# Patient Record
Sex: FEMALE | HISPANIC OR LATINO | Employment: OTHER | URBAN - METROPOLITAN AREA
[De-identification: names, ages, dates, MRNs, and addresses within clinical notes are randomized per-mention and may not be internally consistent; named-entity substitution may affect disease eponyms.]

---

## 2021-08-12 ENCOUNTER — TRANSFERRED RECORDS (OUTPATIENT)
Dept: HEALTH INFORMATION MANAGEMENT | Facility: CLINIC | Age: 36
End: 2021-08-12
Payer: MEDICAID

## 2021-08-12 LAB
C TRACH DNA SPEC QL PROBE+SIG AMP: NOT DETECTED
HPV ABSTRACT: ABNORMAL
N GONORRHOEA DNA SPEC QL PROBE+SIG AMP: NOT DETECTED
PAP-ABSTRACT: NORMAL
SPECIMEN DESCRIP: NORMAL
SPECIMEN DESCRIPTION: NORMAL

## 2022-01-06 ENCOUNTER — TRANSFERRED RECORDS (OUTPATIENT)
Dept: HEALTH INFORMATION MANAGEMENT | Facility: CLINIC | Age: 37
End: 2022-01-06

## 2022-03-30 ENCOUNTER — TRANSFERRED RECORDS (OUTPATIENT)
Dept: HEALTH INFORMATION MANAGEMENT | Facility: CLINIC | Age: 37
End: 2022-03-30

## 2022-04-08 ENCOUNTER — TRANSFERRED RECORDS (OUTPATIENT)
Dept: HEALTH INFORMATION MANAGEMENT | Facility: CLINIC | Age: 37
End: 2022-04-08

## 2022-04-22 ENCOUNTER — TRANSCRIBE ORDERS (OUTPATIENT)
Dept: OTHER | Age: 37
End: 2022-04-22

## 2022-04-22 DIAGNOSIS — D09.9 ADENOCARCINOMA IN SITU: ICD-10-CM

## 2022-04-22 DIAGNOSIS — N87.1 MODERATE CERVICAL DYSPLASIA: Primary | ICD-10-CM

## 2022-04-25 ENCOUNTER — DOCUMENTATION ONLY (OUTPATIENT)
Dept: ONCOLOGY | Facility: CLINIC | Age: 37
End: 2022-04-25

## 2022-04-25 ENCOUNTER — PATIENT OUTREACH (OUTPATIENT)
Dept: ONCOLOGY | Facility: CLINIC | Age: 37
End: 2022-04-25

## 2022-04-25 NOTE — PROGRESS NOTES
"Introduced my role as nurse navigator with [facility] and that we have recd the referral for dx of \"moderate cervical dysplasia, adenocarcinoma in situ\" but no records yet.     Explained to pt that he/she will receive a call from our scheduling intake team and the records team will work to obtain the records.   Pt verbalized understanding and denied further questions, has my call-back number if needed.    Patient lives in [place] and would prefer to be seen at [place] location.     Addendum 4/26: Records received and scanned into media tab. Pt had pap/HPV 8/12/21 showing NILM/HPV+, colpo 3/30. 4/8 visit note states plan is for patient to have LEEP procedure, but no additional records.     Attempted to reach referring clinic, [facility] for Amairani at [phone] requesting call back with update on whether patient had/is scheduled to have LEEP procedure.    Path 3/30/22:          Received call back from Amairani who reports plan was for patient to have LEEP, but the clinic's LEEP machine is down and they are unable to complete procedure. Referring to [facility] for LEEP/further management of care. Will route to scheduling to arrange visit as requested.    Larisa Connors, BSN, RN, PHN, OCN  Hematology/Oncology Nurse Navigator  [facility]  [phone]      "

## 2022-04-25 NOTE — PROGRESS NOTES
Action April 25, 2022 12:11 PM ABT   Action Taken Records from [facility] received and sent to HIM for upload

## 2022-04-28 NOTE — PROGRESS NOTES
RECORDS STATUS - ALL OTHER DIAGNOSIS      Action    Action Taken 4/28/2022 1:11pm ROMEO     I called the [facility] Ph: [phone] - Amairani will email me the [employer] request form so I can request pt Jyoti's path slides. I sent my email address to Amairani Fax: [phone]    5/2/2022 11:10AM ROMEO   I faxed over a special [employer] Path form, request form and a FedEX shipping label to [employer]. I spoke to someone in their medical records dept and they verified the fax number and other info.      RECORDS RECEIVED FROM: [facility]/ [facility]    DATE RECEIVED: 5/3/2022   NOTES STATUS DETAILS   OFFICE NOTE from referring provider Complete Referral called in by Amairani [facility] [phone]   DISCHARGE SUMMARY from hospital Complete [facility] Records are in [facility]   DISCHARGE REPORT from the ER     OPERATIVE REPORT Complete Biopsy in [facility]   CLINICAL TRIAL TREATMENTS TO DATE     LABS     PATHOLOGY REPORTS Requested- [facility]  [facility]  Ph: [phone]  Fax: [phone]  Mailing Address:    [address] [place] [address]  [place] Tracking Number: [id number]     3/30/2022 [id number]    8/21/2021 [id number]   ANYTHING RELATED TO DIAGNOSIS     GENONOMIC TESTING     TYPE:     IMAGING (NEED IMAGES & REPORT)     CT SCANS     MRI     MAMMO     ULTRASOUND     PET

## 2022-05-02 RX ORDER — HEPARIN SODIUM 10000 [USP'U]/ML
5000 INJECTION, SOLUTION INTRAVENOUS; SUBCUTANEOUS
Status: CANCELLED | OUTPATIENT
Start: 2022-05-02

## 2022-05-02 NOTE — PROGRESS NOTES
RECORDS STATUS - ALL OTHER DIAGNOSIS      Action    Action Taken 4/28/2022 1:11pm CHEMO   I called the [facility] Ph: [phone] - Amairani will email me the [employer] request form so I can request shaquille Jyoti's path slides. I sent my email address to Amairani Fax: [phone]    5/2/2022 11:10AM KECHEMO   I faxed over a special [employer] Path form, request form and a FedEX shipping label to [employer]. I spoke to someone in their medical records dept and they verified the fax number and other info.     1:25pm [facility]   I called shaquille Montes with the help of an  Ph: [phone] #1- she had some imaging done -Winifred Uribeesa mentioned that she had a scan at [facility] Ph: [phone]- same as [facility] - I called Amairani again...     5/2/2022 2:54PM CHEMO ConnollyAmairani called back - no imaging available.      RECORDS RECEIVED FROM: [facility]/ [facility]    DATE RECEIVED: 5/3/2022   NOTES STATUS DETAILS   OFFICE NOTE from referring provider Complete Referral called in by [facility] [phone]   DISCHARGE SUMMARY from hospital Complete [facility] Records are in [facility]   DISCHARGE REPORT from the ER     OPERATIVE REPORT Complete Biopsy in [facility]   CLINICAL TRIAL TREATMENTS TO DATE     LABS     PATHOLOGY REPORTS Requested- [facility]  [facility]  Ph: [phone]  Fax: [phone]  Mailing Address:    [address] [place] [address]  [email or web address] Tracking Number: [id number]     3/30/2022 [id number]    8/21/2021 [id number]   ANYTHING RELATED TO DIAGNOSIS     GENONOMIC TESTING     TYPE:     IMAGING (NEED IMAGES & REPORT)     CT SCANS     MRI     MAMMO     ULTRASOUND     PET

## 2022-05-03 ENCOUNTER — ONCOLOGY VISIT (OUTPATIENT)
Dept: ONCOLOGY | Facility: CLINIC | Age: 37
End: 2022-05-03
Attending: OBSTETRICS & GYNECOLOGY
Payer: MEDICAID

## 2022-05-03 ENCOUNTER — PRE VISIT (OUTPATIENT)
Dept: ONCOLOGY | Facility: CLINIC | Age: 37
End: 2022-05-03
Payer: MEDICAID

## 2022-05-03 VITALS
DIASTOLIC BLOOD PRESSURE: 70 MMHG | TEMPERATURE: 97 F | SYSTOLIC BLOOD PRESSURE: 106 MMHG | RESPIRATION RATE: 14 BRPM | BODY MASS INDEX: 24.27 KG/M2 | HEART RATE: 60 BPM | HEIGHT: 63 IN | WEIGHT: 137 LBS | OXYGEN SATURATION: 99 %

## 2022-05-03 DIAGNOSIS — D09.9 ADENOCARCINOMA IN SITU: ICD-10-CM

## 2022-05-03 DIAGNOSIS — N87.1 MODERATE CERVICAL DYSPLASIA: ICD-10-CM

## 2022-05-03 DIAGNOSIS — D06.9 ADENOCARCINOMA IN SITU (AIS) OF UTERINE CERVIX: Primary | ICD-10-CM

## 2022-05-03 PROCEDURE — 88305 TISSUE EXAM BY PATHOLOGIST: CPT | Mod: TC | Performed by: OBSTETRICS & GYNECOLOGY

## 2022-05-03 PROCEDURE — G0463 HOSPITAL OUTPT CLINIC VISIT: HCPCS

## 2022-05-03 PROCEDURE — 99205 OFFICE O/P NEW HI 60 MIN: CPT | Mod: 25 | Performed by: OBSTETRICS & GYNECOLOGY

## 2022-05-03 PROCEDURE — 57500 BIOPSY OF CERVIX: CPT | Performed by: OBSTETRICS & GYNECOLOGY

## 2022-05-03 ASSESSMENT — PAIN SCALES - GENERAL: PAINLEVEL: NO PAIN (0)

## 2022-05-03 NOTE — PROGRESS NOTES
"Consult Notes on Referred Patient              RE: Jyoti Pitts  : 1985  CHAI: May 3, 2022      I had the pleasure of seeing your patient Jyoti Pitts here at the Gynecologic Cancer Clinic at the [facility] on 5/3/2022.  As you know she is a very pleasant 37 year old woman with a recent diagnosis of CIN2, AIS.  Given these findings she was subsequently sent to the Gynecologic Cancer Clinic for new patient consultation.  She is accompanied today by her sister via telephone.  A Khmer telephone  was used for the entire visit.    She reports she had an abnormal pap smear in [place] 2 years ago.  She did not follow up and has moved to [place] recently so established care.  She has regular menses without abnormal bleeding.    21:  Pap:  Negative, HPV 16+    3/30/22:  Cervical biopsy:  CIN2 with changes suspicious for AIS      Obstetrics and Gynecology History:  ,  x 2  Regular menses  She has completed child bearing      Past Medical History:  History reviewed. No pertinent past medical history.    Past Surgical History:  History reviewed. No pertinent surgical history.    Health Maintenance:  Last Pap Smear: 20              Result: negative, HPV +  She has had a history of abnormal Pap smears.    Last Mammogram: N/A    Last Colonoscopy: N/A       Social History:  Lives with her children, feels safe at home.  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  Does not have an advanced directive on file and would like her sister Alla to be her POA.  Full code    Family History:   The patient's family history is significant for.  Family History   Problem Relation Age of Onset     Cancer No family hx of          Physical Exam:   /70 (Cuff Size: Adult Regular)   Pulse 60   Temp 97  F (36.1  C) (Tympanic)   Resp 14   Ht 1.588 m (5' 2.5\")   Wt 62.1 kg (137 lb)   LMP 2022 (Exact Date)   SpO2 99%   BMI 24.66 kg/m    Body mass index is 24.66 " kg/m .    General Appearance: healthy and alert, no distress        Cardiovascular: regular rate and rhythm    Respiratory: lungs clear     Gastrointestinal:       abdomen soft, non-tender, non-distended, no organomegaly or masses    Genitourinary: External genitalia and urethral meatus appears normal.  Vagina is smooth without nodularity or masses.  Cervix with a lesion encompassing the entire anterior cervix measuring approximately 2 cm.  Bimanual exam reveal no masses, nodularity or fullness.  Recto-vaginal exam confirms these findings.    Procedure Note:  Consent was obtained.  Speculum was placed and Tischler biopsy forceps was used to obtain two biopsies from 11 and 1 o'clock on the cervix in the area suspicious for malignancy.  Hemostasis was achieved with application of pressure.  patient tolerated the procedure well.  A separate 5 minutes was spent on the procedure today.    Labs:  None      Assessment:    Jyoti Pitts is a 37 year old woman with a new diagnosis of CIN2, AIS.     A total of 60 minutes was spent on patient care today.       Plan:     1.)    CIN2/AIS-we reveiwed the natural history and progression of disease, especially as it relates to HPV. We reviewed her exam today which is suspicious for cancer and thus a second biopsy was obtained.  If this is positive, plan would be for MRI and PET to evaluate if surgical treatment is appropriate.  If negative, then we discussed the need for an excisional procedure with [facility].  Risks, benefits, indications and alternatives were reviewed.  All her questions were answered and she will undergo cervical cold knife conization on 5/12 pending biopsy result.     2.) Genetic risk factors were assessed and the patient does not meet the qualifications for a referral.      3.) Labs and/or tests ordered include: Cervical biopsy     4.) Health maintenance issues addressed today include pt is up to date.    5.) Pre-op teaching was completed today.         Thank you for allowing us to participate in the care of your patient.         Sincerely,    Miranda Cortez MD  Gynecologic Oncology  [facility]       CC

## 2022-05-03 NOTE — PROGRESS NOTES
Nursing Note:  Jyoti Pitts presents today for cervical bx.  Patient seen by provider today: Yes: Dr Cortez.   present during visit today: Yes, Language: Cuban.     Note: N/A.    Labs:  Not Applicable.     Procedure:  Gynecological Procedure: cervical Biopsy.     Verified:  Time out included verbal and active participation of all team members: Yes.      Post Procedure:  Patient tolerated the procedure without incident..    Post Pain Assessment: 0 out of 10.    Discharge Plan:   Departure Mode: Ambulatory.    Miranda Wilkinson CMA

## 2022-05-03 NOTE — LETTER
5/3/2022         RE: Jyoti Pitts  [address] [facility] [address]        Dear Colleague,    Thank you for referring your patient, Jyoti Pitts, to the [facility]. Please see a copy of my visit note below.    Consult Notes on Referred Patient              RE: Jyoti Pitts  : 1985  CHAI: May 3, 2022      I had the pleasure of seeing your patient Jyoti Pitts here at the Gynecologic Cancer Clinic at the [facility] on 5/3/2022.  As you know she is a very pleasant 37 year old woman with a recent diagnosis of CIN2, AIS.  Given these findings she was subsequently sent to the Gynecologic Cancer Clinic for new patient consultation.  She is accompanied today by her sister via telephone.  A Wallisian telephone  was used for the entire visit.    She reports she had an abnormal pap smear in [place] 2 years ago.  She did not follow up and has moved to [place] recently so established care.  She has regular menses without abnormal bleeding.    21:  Pap:  Negative, HPV 16+    3/30/22:  Cervical biopsy:  CIN2 with changes suspicious for AIS      Obstetrics and Gynecology History:  ,  x 2  Regular menses  She has completed child bearing      Past Medical History:  History reviewed. No pertinent past medical history.    Past Surgical History:  History reviewed. No pertinent surgical history.    Health Maintenance:  Last Pap Smear: 20              Result: negative, HPV +  She has had a history of abnormal Pap smears.    Last Mammogram: N/A    Last Colonoscopy: N/A       Social History:  Lives with her children, feels safe at home.  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  Does not have an advanced directive on file and would like her sister Alla to be her POA.  Full code    Family History:   The patient's family history is significant for.  Family History   Problem Relation Age of Onset     Cancer No family hx  "of          Physical Exam:   /70 (Cuff Size: Adult Regular)   Pulse 60   Temp 97  F (36.1  C) (Tympanic)   Resp 14   Ht 1.588 m (5' 2.5\")   Wt 62.1 kg (137 lb)   LMP 05/01/2022 (Exact Date)   SpO2 99%   BMI 24.66 kg/m    Body mass index is 24.66 kg/m .    General Appearance: healthy and alert, no distress        Cardiovascular: regular rate and rhythm    Respiratory: lungs clear     Gastrointestinal:       abdomen soft, non-tender, non-distended, no organomegaly or masses    Genitourinary: External genitalia and urethral meatus appears normal.  Vagina is smooth without nodularity or masses.  Cervix with a lesion encompassing the entire anterior cervix measuring approximately 2 cm.  Bimanual exam reveal no masses, nodularity or fullness.  Recto-vaginal exam confirms these findings.    Procedure Note:  Consent was obtained.  Speculum was placed and Tischler biopsy forceps was used to obtain two biopsies from 11 and 1 o'clock on the cervix in the area suspicious for malignancy.  Hemostasis was achieved with application of pressure.  patient tolerated the procedure well.  A separate 5 minutes was spent on the procedure today.    Labs:  None      Assessment:    Jyoti Pitts is a 37 year old woman with a new diagnosis of CIN2, AIS.     A total of 60 minutes was spent on patient care today.       Plan:     1.)    CIN2/AIS-we reveiwed the natural history and progression of disease, especially as it relates to HPV. We reviewed her exam today which is suspicious for cancer and thus a second biopsy was obtained.  If this is positive, plan would be for MRI and PET to evaluate if surgical treatment is appropriate.  If negative, then we discussed the need for an excisional procedure with [facility].  Risks, benefits, indications and alternatives were reviewed.  All her questions were answered and she will undergo cervical cold knife conization on 5/12 pending biopsy result.     2.) Genetic risk factors were " assessed and the patient does not meet the qualifications for a referral.      3.) Labs and/or tests ordered include: Cervical biopsy     4.) Health maintenance issues addressed today include pt is up to date.    5.) Pre-op teaching was completed today.        Thank you for allowing us to participate in the care of your patient.         Sincerely,    Miranda Cortez MD  Gynecologic Oncology  [facility]       CC        Nursing Note:  Jyoti Pitts presents today for cervical bx.  Patient seen by provider today: Yes: Dr Cortez.   present during visit today: Yes, Language: Andorran.     Note: N/A.    Labs:  Not Applicable.     Procedure:  Gynecological Procedure: cervical Biopsy.     Verified:  Time out included verbal and active participation of all team members: Yes.      Post Procedure:  Patient tolerated the procedure without incident..    Post Pain Assessment: 0 out of 10.    Discharge Plan:   Departure Mode: Ambulatory.    Miranda Wilkinson CMA        Again, thank you for allowing me to participate in the care of your patient.        Sincerely,        Fransico Cortez MD

## 2022-05-03 NOTE — PATIENT INSTRUCTIONS
You have been scheduled for surgery on: 5/12    Diagnosis:  Cervical dysplasia    The surgical procedure is: cervical cold knife conization    Anticipated length of surgery: 30 minutes.  You will be in the recovery room for approximately 1-2 hrs after surgery.  Your family will not be able to see you until after you leave the recovery room.    Length of hospital stay:  This is an outpatient surgery  ______________________________________________________________________    Preparation for Surgery:    To Schedule   1.  Post-operative exam with me 1-2 weeks following surgery      Postoperative Restrictions:  Nothing in the vagina (no tampons, no intercourse, no douching) for 2 weeks.     Postoperative visit:  Return to clinic 1-2 weeks after surgery for post operative visit.      Please contact the clinic with any questions or concerns.    Miranda Cortez MD  Gynecologic Oncology  [facility]

## 2022-05-03 NOTE — NURSING NOTE
"Oncology Rooming Note    May 3, 2022 10:16 AM   Jyoti Pitts is a 37 year old female who presents for:    Chief Complaint   Patient presents with     Oncology Clinic Visit     New patient- Adenocarcinoma in situ (AIS) of uterine cervix     Initial Vitals: /70 (Cuff Size: Adult Regular)   Pulse 60   Temp 97  F (36.1  C) (Tympanic)   Resp 14   Ht 1.588 m (5' 2.5\")   Wt 62.1 kg (137 lb)   LMP 05/01/2022 (Exact Date)   SpO2 99%   BMI 24.66 kg/m   Estimated body mass index is 24.66 kg/m  as calculated from the following:    Height as of this encounter: 1.588 m (5' 2.5\").    Weight as of this encounter: 62.1 kg (137 lb). Body surface area is 1.65 meters squared.  No Pain (0) Comment: Data Unavailable   Patient's last menstrual period was 05/01/2022 (exact date).  Allergies reviewed: Yes  Medications reviewed: Yes    Medications: Medication refills not needed today.  Pharmacy name entered into [employer]: [id number] - [place], [place] - [address]    Clinical concerns: new patient       Miranda Wilkinson CMA              "

## 2022-05-04 ENCOUNTER — LAB (OUTPATIENT)
Dept: LAB | Facility: CLINIC | Age: 37
End: 2022-05-04
Payer: MEDICAID

## 2022-05-04 DIAGNOSIS — R87.613 HIGH GRADE SQUAMOUS INTRAEPITHELIAL LESION OF CERVIX: Primary | ICD-10-CM

## 2022-05-04 DIAGNOSIS — Z11.59 ENCOUNTER FOR SCREENING FOR OTHER VIRAL DISEASES: Primary | ICD-10-CM

## 2022-05-05 LAB
PATH REPORT.COMMENTS IMP SPEC: ABNORMAL
PATH REPORT.COMMENTS IMP SPEC: YES
PATH REPORT.FINAL DX SPEC: ABNORMAL
PATH REPORT.GROSS SPEC: ABNORMAL
PATH REPORT.MICROSCOPIC SPEC OTHER STN: ABNORMAL
PATH REPORT.RELEVANT HX SPEC: ABNORMAL
PHOTO IMAGE: ABNORMAL

## 2022-05-05 PROCEDURE — 88305 TISSUE EXAM BY PATHOLOGIST: CPT | Mod: 26 | Performed by: PATHOLOGY

## 2022-05-06 LAB
PATH REPORT.COMMENTS IMP SPEC: NORMAL
PATH REPORT.FINAL DX SPEC: NORMAL
PATH REPORT.GROSS SPEC: NORMAL
PATH REPORT.MICROSCOPIC SPEC OTHER STN: NORMAL
PATH REPORT.RELEVANT HX SPEC: NORMAL
PATH REPORT.RELEVANT HX SPEC: NORMAL
PATH REPORT.SITE OF ORIGIN SPEC: NORMAL

## 2022-05-06 PROCEDURE — 88321 CONSLTJ&REPRT SLD PREP ELSWR: CPT | Mod: GC | Performed by: PATHOLOGY

## 2022-05-09 ENCOUNTER — PATIENT OUTREACH (OUTPATIENT)
Dept: ONCOLOGY | Facility: CLINIC | Age: 37
End: 2022-05-09
Payer: MEDICAID

## 2022-05-09 LAB
PATH REPORT.COMMENTS IMP SPEC: NORMAL
PATH REPORT.COMMENTS IMP SPEC: NORMAL
PATH REPORT.FINAL DX SPEC: NORMAL
PATH REPORT.GROSS SPEC: NORMAL
PATH REPORT.MICROSCOPIC SPEC OTHER STN: NORMAL
PATH REPORT.RELEVANT HX SPEC: NORMAL
PATH REPORT.RELEVANT HX SPEC: NORMAL
PATH REPORT.SITE OF ORIGIN SPEC: NORMAL

## 2022-05-09 PROCEDURE — 88321 CONSLTJ&REPRT SLD PREP ELSWR: CPT | Mod: GC | Performed by: PATHOLOGY

## 2022-05-09 NOTE — PROGRESS NOTES
Writer received call from Jyoti looking to clarify her appointments. Per chart review, pre-procedure COVID swab is scheduled 5/10/22 at 11:15am ahead of cervical cone biopsy scheduled 5/12/22 with Dr. Cortez in the OR. Jyoti is wondering why appointment tomorrow 5/10/22 at 1pm with Dr. Cortez was canceled--unclear via chart review.    Writer will route to Dr. Cortez and Dr. Cortez's RNCC for further clarification. Writer offered to return call to Jyoti with use of , which she would prefer.    Carey Adkins, RN, BSN, OCN  Nurse Care Coordinator  [facility]  P: [phone]     F: [phone]

## 2022-05-10 ENCOUNTER — LAB (OUTPATIENT)
Dept: LAB | Facility: CLINIC | Age: 37
End: 2022-05-10
Attending: OBSTETRICS & GYNECOLOGY
Payer: MEDICAID

## 2022-05-10 ENCOUNTER — PATIENT OUTREACH (OUTPATIENT)
Dept: ONCOLOGY | Facility: CLINIC | Age: 37
End: 2022-05-10

## 2022-05-10 ENCOUNTER — PRE VISIT (OUTPATIENT)
Dept: ONCOLOGY | Facility: CLINIC | Age: 37
End: 2022-05-10

## 2022-05-10 DIAGNOSIS — Z11.59 ENCOUNTER FOR SCREENING FOR OTHER VIRAL DISEASES: ICD-10-CM

## 2022-05-10 PROCEDURE — U0005 INFEC AGEN DETEC AMPLI PROBE: HCPCS

## 2022-05-10 PROCEDURE — U0003 INFECTIOUS AGENT DETECTION BY NUCLEIC ACID (DNA OR RNA); SEVERE ACUTE RESPIRATORY SYNDROME CORONAVIRUS 2 (SARS-COV-2) (CORONAVIRUS DISEASE [COVID-19]), AMPLIFIED PROBE TECHNIQUE, MAKING USE OF HIGH THROUGHPUT TECHNOLOGIES AS DESCRIBED BY CMS-2020-01-R: HCPCS

## 2022-05-10 NOTE — PROGRESS NOTES
Late entry from 5/3/2022:  Met with pt after clinic visit with Dr Cortez with  for surgical education.      GYN ONC Pre-Op Education - Nursing     Relevant Diagnosis: Cervical Dyplasia     Teaching Topic: Surgical education for Cervical Cold Knife Conizaton    Teaching Concerns addressed: Yes    Patient demonstrates understanding of the following:   Reason for the appointment, diagnosis and treatment plan:   Yes   Knowledge of proper use of medications and conditions for which they are ordered (with special attention to potential side effects or drug interactions): Yes   Which situations necessitate calling provider and whom to contact: Yes       Nutritional needs and diet plan:  Yes      Pain management techniques:  Yes  Diet:  Yes     Infection Prevention:  Patient demonstrates understanding of the following:   Pre-Op CHG Bathing Instructions: Yes  Surgical procedure site care taught:   Yes   Signs and symptoms of infection taught: Yes     Instructional Materials Used/Given:  [facility] Preparing for Your Surgery  Showering or Bathing before Surgery Instructions & CHG Product (2 bottles)  Cervical Cold Knife Conization  Map  Phone number for [facility]     Comments:  Met with pt for pre-op teaching for surgery on 5/3/2022.  Reviewed NPO restrictions prior to surgery with pt (No food or milk products 8 hours prior to surgery; Only clear liquids up to 2 hours prior to surgery i.e., water, black coffee, tea, apple juice).  Also reviewed medications that must be held for at least 7 days prior to surgery (Aspirin, Ibuprofen, Naproxen, Fish oil/Flax seed oil, Multivitamin/Vit. E, or any other product containing aspirin, or NSAIDs).  No medications to take on morning of surgery as pt states she is not taking routine medications.  Pt will need to see Dr cortez, 1-2 weeks after her surgery. Pt will need someone to drive her home after surgery, and someone to stay with her for at least 24  hours after she arrives home. Pt states she will stay at her sister's house or her sister will stay with pt.  Reviewed with pt signs and symptoms that would warrant contacting provider immediately.  Also reviewed lifting restrictions after surgery with pt.  Reviewed AVS instructions with pt per Dr Cortez as well. Pt had the opportunity to ask questions, and all questions were answered to her satisfaction. Patient verbalized understanding and agreement with plan.  Pt was instructed to call the clinic with any questions, concerns, or worsening symptoms.     Roshni Engel RN, BSN, OCN

## 2022-05-11 ENCOUNTER — ANESTHESIA EVENT (OUTPATIENT)
Dept: SURGERY | Facility: CLINIC | Age: 37
End: 2022-05-11
Payer: MEDICAID

## 2022-05-11 LAB — SARS-COV-2 RNA RESP QL NAA+PROBE: NEGATIVE

## 2022-05-11 NOTE — ANESTHESIA PREPROCEDURE EVALUATION
Anesthesia Pre-Procedure Evaluation    Patient: Jyoti Pitts   MRN: [id number] : 1985        Procedure : Procedure(s):  CONE BIOPSY, CERVIX          History reviewed. No pertinent past medical history.   History reviewed. No pertinent surgical history.   No Known Allergies   Social History     Tobacco Use     Smoking status: Never Smoker     Smokeless tobacco: Never Used   Substance Use Topics     Alcohol use: Never      Wt Readings from Last 1 Encounters:   22 62.1 kg (137 lb)        Anesthesia Evaluation   Pt has not had prior anesthetic         ROS/MED HX  ENT/Pulmonary:  - neg pulmonary ROS     Neurologic:  - neg neurologic ROS     Cardiovascular:  - neg cardiovascular ROS     METS/Exercise Tolerance:     Hematologic:  - neg hematologic  ROS     Musculoskeletal:  - neg musculoskeletal ROS     GI/Hepatic:  - neg GI/hepatic ROS     Renal/Genitourinary:  - neg Renal ROS     Endo:  - neg endo ROS     Psychiatric/Substance Use:  - neg psychiatric ROS     Infectious Disease:  - neg infectious disease ROS     Malignancy:  - neg malignancy ROS     Other:            Physical Exam    Airway        Mallampati: II   TM distance: > 3 FB   Neck ROM: full   Mouth opening: > 3 cm    Respiratory Devices and Support         Dental  no notable dental history         Cardiovascular   cardiovascular exam normal          Pulmonary   pulmonary exam normal        breath sounds clear to auscultation           OUTSIDE LABS:  CBC:   Lab Results   Component Value Date    HGB 11.8 2005     BMP: No results found for: NA, POTASSIUM, CHLORIDE, CO2, BUN, CR, GLC  COAGS: No results found for: PTT, INR, FIBR  POC: No results found for: BGM, HCG, HCGS  HEPATIC: No results found for: ALBUMIN, PROTTOTAL, ALT, AST, GGT, ALKPHOS, BILITOTAL, BILIDIRECT, MITCH  OTHER: No results found for: PH, LACT, A1C, PEMA, PHOS, MAG, LIPASE, AMYLASE, TSH, T4, T3, CRP, SED    Anesthesia Plan    ASA Status:  2      Anesthesia Type:  General.     - Airway: LMA   Induction: Intravenous.   Maintenance: Balanced.        Consents    Anesthesia Plan(s) and associated risks, benefits, and realistic alternatives discussed. Questions answered and patient/representative(s) expressed understanding.    - Discussed:     - Discussed with:  Patient         Postoperative Care       PONV prophylaxis: Ondansetron (or other 5HT-3), Dexamethasone or Solumedrol     Comments:                Rahul Blanc MD

## 2022-05-12 ENCOUNTER — ANESTHESIA (OUTPATIENT)
Dept: SURGERY | Facility: CLINIC | Age: 37
End: 2022-05-12
Payer: MEDICAID

## 2022-05-12 ENCOUNTER — HOSPITAL ENCOUNTER (OUTPATIENT)
Facility: CLINIC | Age: 37
Discharge: HOME | End: 2022-05-12
Attending: OBSTETRICS & GYNECOLOGY | Admitting: OBSTETRICS & GYNECOLOGY
Payer: MEDICAID

## 2022-05-12 ENCOUNTER — APPOINTMENT (OUTPATIENT)
Dept: INTERPRETER SERVICES | Facility: CLINIC | Age: 37
End: 2022-05-12
Attending: OBSTETRICS & GYNECOLOGY
Payer: MEDICAID

## 2022-05-12 VITALS
SYSTOLIC BLOOD PRESSURE: 102 MMHG | HEIGHT: 63 IN | TEMPERATURE: 98.1 F | BODY MASS INDEX: 24.34 KG/M2 | OXYGEN SATURATION: 100 % | WEIGHT: 137.35 LBS | RESPIRATION RATE: 16 BRPM | DIASTOLIC BLOOD PRESSURE: 70 MMHG | HEART RATE: 67 BPM

## 2022-05-12 DIAGNOSIS — D06.9 ADENOCARCINOMA IN SITU (AIS) OF UTERINE CERVIX: Primary | ICD-10-CM

## 2022-05-12 LAB
GLUCOSE BLDC GLUCOMTR-MCNC: 80 MG/DL (ref 70–99)
HCG UR QL: NEGATIVE

## 2022-05-12 PROCEDURE — 88305 TISSUE EXAM BY PATHOLOGIST: CPT | Mod: TC | Performed by: OBSTETRICS & GYNECOLOGY

## 2022-05-12 PROCEDURE — 250N000009 HC RX 250: Performed by: NURSE ANESTHETIST, CERTIFIED REGISTERED

## 2022-05-12 PROCEDURE — 999N000141 HC STATISTIC PRE-PROCEDURE NURSING ASSESSMENT: Performed by: OBSTETRICS & GYNECOLOGY

## 2022-05-12 PROCEDURE — 82962 GLUCOSE BLOOD TEST: CPT

## 2022-05-12 PROCEDURE — 250N000011 HC RX IP 250 OP 636: Performed by: NURSE ANESTHETIST, CERTIFIED REGISTERED

## 2022-05-12 PROCEDURE — 370N000017 HC ANESTHESIA TECHNICAL FEE, PER MIN: Performed by: OBSTETRICS & GYNECOLOGY

## 2022-05-12 PROCEDURE — 81025 URINE PREGNANCY TEST: CPT | Performed by: OBSTETRICS & GYNECOLOGY

## 2022-05-12 PROCEDURE — 710N000012 HC RECOVERY PHASE 2, PER MINUTE: Performed by: OBSTETRICS & GYNECOLOGY

## 2022-05-12 PROCEDURE — 258N000003 HC RX IP 258 OP 636: Performed by: NURSE ANESTHETIST, CERTIFIED REGISTERED

## 2022-05-12 PROCEDURE — 250N000011 HC RX IP 250 OP 636: Performed by: OBSTETRICS & GYNECOLOGY

## 2022-05-12 PROCEDURE — 250N000013 HC RX MED GY IP 250 OP 250 PS 637: Performed by: ANESTHESIOLOGY

## 2022-05-12 PROCEDURE — 250N000013 HC RX MED GY IP 250 OP 250 PS 637

## 2022-05-12 PROCEDURE — 272N000001 HC OR GENERAL SUPPLY STERILE: Performed by: OBSTETRICS & GYNECOLOGY

## 2022-05-12 PROCEDURE — 360N000075 HC SURGERY LEVEL 2, PER MIN: Performed by: OBSTETRICS & GYNECOLOGY

## 2022-05-12 RX ORDER — LIDOCAINE 40 MG/G
CREAM TOPICAL
Status: DISCONTINUED | OUTPATIENT
Start: 2022-05-12 | End: 2022-05-12 | Stop reason: HOSPADM

## 2022-05-12 RX ORDER — SODIUM CHLORIDE, SODIUM LACTATE, POTASSIUM CHLORIDE, CALCIUM CHLORIDE 600; 310; 30; 20 MG/100ML; MG/100ML; MG/100ML; MG/100ML
INJECTION, SOLUTION INTRAVENOUS CONTINUOUS
Status: DISCONTINUED | OUTPATIENT
Start: 2022-05-12 | End: 2022-05-12 | Stop reason: HOSPADM

## 2022-05-12 RX ORDER — FENTANYL CITRATE 50 UG/ML
25 INJECTION, SOLUTION INTRAMUSCULAR; INTRAVENOUS EVERY 5 MIN PRN
Status: DISCONTINUED | OUTPATIENT
Start: 2022-05-12 | End: 2022-05-12 | Stop reason: HOSPADM

## 2022-05-12 RX ORDER — HEPARIN SODIUM 5000 [USP'U]/.5ML
5000 INJECTION, SOLUTION INTRAVENOUS; SUBCUTANEOUS
Status: COMPLETED | OUTPATIENT
Start: 2022-05-12 | End: 2022-05-12

## 2022-05-12 RX ORDER — FENTANYL CITRATE 50 UG/ML
INJECTION, SOLUTION INTRAMUSCULAR; INTRAVENOUS PRN
Status: DISCONTINUED | OUTPATIENT
Start: 2022-05-12 | End: 2022-05-12

## 2022-05-12 RX ORDER — PROPOFOL 10 MG/ML
INJECTION, EMULSION INTRAVENOUS CONTINUOUS PRN
Status: DISCONTINUED | OUTPATIENT
Start: 2022-05-12 | End: 2022-05-12

## 2022-05-12 RX ORDER — HYDROMORPHONE HCL IN WATER/PF 6 MG/30 ML
0.2 PATIENT CONTROLLED ANALGESIA SYRINGE INTRAVENOUS EVERY 5 MIN PRN
Status: DISCONTINUED | OUTPATIENT
Start: 2022-05-12 | End: 2022-05-12 | Stop reason: HOSPADM

## 2022-05-12 RX ORDER — DEXAMETHASONE SODIUM PHOSPHATE 4 MG/ML
INJECTION, SOLUTION INTRA-ARTICULAR; INTRALESIONAL; INTRAMUSCULAR; INTRAVENOUS; SOFT TISSUE PRN
Status: DISCONTINUED | OUTPATIENT
Start: 2022-05-12 | End: 2022-05-12

## 2022-05-12 RX ORDER — ONDANSETRON 4 MG/1
4 TABLET, ORALLY DISINTEGRATING ORAL EVERY 30 MIN PRN
Status: DISCONTINUED | OUTPATIENT
Start: 2022-05-12 | End: 2022-05-12 | Stop reason: HOSPADM

## 2022-05-12 RX ORDER — IBUPROFEN 600 MG/1
600 TABLET, FILM COATED ORAL EVERY 6 HOURS PRN
Qty: 12 TABLET | Refills: 0 | Status: SHIPPED | OUTPATIENT
Start: 2022-05-12

## 2022-05-12 RX ORDER — ACETAMINOPHEN 325 MG/1
650 TABLET ORAL EVERY 6 HOURS PRN
Qty: 24 TABLET | Refills: 0 | Status: SHIPPED | OUTPATIENT
Start: 2022-05-12

## 2022-05-12 RX ORDER — PROPOFOL 10 MG/ML
INJECTION, EMULSION INTRAVENOUS PRN
Status: DISCONTINUED | OUTPATIENT
Start: 2022-05-12 | End: 2022-05-12

## 2022-05-12 RX ORDER — ONDANSETRON 2 MG/ML
4 INJECTION INTRAMUSCULAR; INTRAVENOUS EVERY 30 MIN PRN
Status: DISCONTINUED | OUTPATIENT
Start: 2022-05-12 | End: 2022-05-12 | Stop reason: HOSPADM

## 2022-05-12 RX ORDER — ACETAMINOPHEN 325 MG/1
975 TABLET ORAL ONCE
Status: COMPLETED | OUTPATIENT
Start: 2022-05-12 | End: 2022-05-12

## 2022-05-12 RX ORDER — SODIUM CHLORIDE, SODIUM LACTATE, POTASSIUM CHLORIDE, CALCIUM CHLORIDE 600; 310; 30; 20 MG/100ML; MG/100ML; MG/100ML; MG/100ML
INJECTION, SOLUTION INTRAVENOUS CONTINUOUS PRN
Status: DISCONTINUED | OUTPATIENT
Start: 2022-05-12 | End: 2022-05-12

## 2022-05-12 RX ORDER — AMOXICILLIN 250 MG
1-2 CAPSULE ORAL 2 TIMES DAILY
Qty: 30 TABLET | Refills: 0 | Status: SHIPPED | OUTPATIENT
Start: 2022-05-12

## 2022-05-12 RX ORDER — LIDOCAINE HYDROCHLORIDE 20 MG/ML
INJECTION, SOLUTION INFILTRATION; PERINEURAL PRN
Status: DISCONTINUED | OUTPATIENT
Start: 2022-05-12 | End: 2022-05-12

## 2022-05-12 RX ORDER — OXYCODONE HYDROCHLORIDE 5 MG/1
5 TABLET ORAL EVERY 4 HOURS PRN
Status: DISCONTINUED | OUTPATIENT
Start: 2022-05-12 | End: 2022-05-12 | Stop reason: HOSPADM

## 2022-05-12 RX ORDER — OXYCODONE HYDROCHLORIDE 5 MG/1
5 TABLET ORAL EVERY 6 HOURS PRN
Qty: 6 TABLET | Refills: 0 | Status: SHIPPED | OUTPATIENT
Start: 2022-05-12

## 2022-05-12 RX ORDER — KETOROLAC TROMETHAMINE 30 MG/ML
INJECTION, SOLUTION INTRAMUSCULAR; INTRAVENOUS PRN
Status: DISCONTINUED | OUTPATIENT
Start: 2022-05-12 | End: 2022-05-12

## 2022-05-12 RX ORDER — OXYCODONE HYDROCHLORIDE 5 MG/1
5 TABLET ORAL
Status: DISCONTINUED | OUTPATIENT
Start: 2022-05-12 | End: 2022-05-12 | Stop reason: HOSPADM

## 2022-05-12 RX ORDER — ONDANSETRON 2 MG/ML
INJECTION INTRAMUSCULAR; INTRAVENOUS PRN
Status: DISCONTINUED | OUTPATIENT
Start: 2022-05-12 | End: 2022-05-12

## 2022-05-12 RX ORDER — IBUPROFEN 400 MG/1
800 TABLET, FILM COATED ORAL ONCE
Status: DISCONTINUED | OUTPATIENT
Start: 2022-05-12 | End: 2022-05-12 | Stop reason: HOSPADM

## 2022-05-12 RX ADMIN — PROPOFOL 40 MG: 10 INJECTION, EMULSION INTRAVENOUS at 09:46

## 2022-05-12 RX ADMIN — ACETAMINOPHEN 975 MG: 325 TABLET ORAL at 11:11

## 2022-05-12 RX ADMIN — PROPOFOL 20 MG: 10 INJECTION, EMULSION INTRAVENOUS at 10:26

## 2022-05-12 RX ADMIN — FENTANYL CITRATE 25 MCG: 50 INJECTION, SOLUTION INTRAMUSCULAR; INTRAVENOUS at 09:55

## 2022-05-12 RX ADMIN — PROPOFOL 20 MG: 10 INJECTION, EMULSION INTRAVENOUS at 09:50

## 2022-05-12 RX ADMIN — LIDOCAINE HYDROCHLORIDE 60 MG: 20 INJECTION, SOLUTION INFILTRATION; PERINEURAL at 09:46

## 2022-05-12 RX ADMIN — DEXAMETHASONE SODIUM PHOSPHATE 4 MG: 4 INJECTION, SOLUTION INTRA-ARTICULAR; INTRALESIONAL; INTRAMUSCULAR; INTRAVENOUS; SOFT TISSUE at 09:51

## 2022-05-12 RX ADMIN — SODIUM CHLORIDE, POTASSIUM CHLORIDE, SODIUM LACTATE AND CALCIUM CHLORIDE: 600; 310; 30; 20 INJECTION, SOLUTION INTRAVENOUS at 09:41

## 2022-05-12 RX ADMIN — PROPOFOL 75 MCG/KG/MIN: 10 INJECTION, EMULSION INTRAVENOUS at 09:48

## 2022-05-12 RX ADMIN — PROPOFOL 20 MG: 10 INJECTION, EMULSION INTRAVENOUS at 10:19

## 2022-05-12 RX ADMIN — ONDANSETRON 4 MG: 2 INJECTION INTRAMUSCULAR; INTRAVENOUS at 09:51

## 2022-05-12 RX ADMIN — HEPARIN SODIUM 5000 UNITS: 5000 INJECTION, SOLUTION INTRAVENOUS; SUBCUTANEOUS at 09:35

## 2022-05-12 RX ADMIN — MIDAZOLAM 2 MG: 1 INJECTION INTRAMUSCULAR; INTRAVENOUS at 09:41

## 2022-05-12 RX ADMIN — PROPOFOL 20 MG: 10 INJECTION, EMULSION INTRAVENOUS at 10:14

## 2022-05-12 RX ADMIN — OXYCODONE HYDROCHLORIDE 5 MG: 5 TABLET ORAL at 11:10

## 2022-05-12 RX ADMIN — KETOROLAC TROMETHAMINE 20 MG: 30 INJECTION, SOLUTION INTRAMUSCULAR at 10:26

## 2022-05-12 NOTE — ANESTHESIA POSTPROCEDURE EVALUATION
Patient: Jyoti Pitts    Procedure: Procedure(s):  Cervical Cone Biopsy, Endocervical Curettage       Anesthesia Type:  General    Note:  Disposition: Outpatient   Postop Pain Control: Uneventful            Sign Out: Well controlled pain   PONV: No   Neuro/Psych: Uneventful            Sign Out: Acceptable/Baseline neuro status   Airway/Respiratory: Uneventful            Sign Out: Acceptable/Baseline resp. status   CV/Hemodynamics: Uneventful            Sign Out: Acceptable CV status; No obvious hypovolemia; No obvious fluid overload   Other NRE: NONE   DID A NON-ROUTINE EVENT OCCUR? No           Last vitals:  Vitals Value Taken Time   /69 05/12/22 1036   Temp 36.7  C (98.1  F) 05/12/22 1036   Pulse     Resp 18 05/12/22 1036   SpO2 100 % 05/12/22 1036       Electronically Signed By: Juliette Navarro MD  May 12, 2022  11:42 AM

## 2022-05-12 NOTE — DISCHARGE INSTRUCTIONS
You have 1 piece of guaze in your vagina. This may fall out in the next 5 days and that is ok. Nothing in your vagina for two weeks.

## 2022-05-12 NOTE — BRIEF OP NOTE
[facility] [place]    Brief Operative Note    Pre-operative diagnosis: Adenocarcinoma in situ (AIS) of uterine cervix [D06.9]  Post-operative diagnosis Same as pre-operative diagnosis    Procedure: Procedure(s):  Cervical Cone Biopsy, Endocervical Curettage  Surgeon: Surgeon(s) and Role:     * Miranda Rosenthal MD - Primary     * Kassandra Campos MD - Resident - Assisting     * Eliud Haynes MD - Fellow - Assisting  Anesthesia: Monitor Anesthesia Care   Estimated Blood Loss: Less than 50 ml    Drains: None  Specimens:   ID Type Source Tests Collected by Time Destination   1 : Cervical Cone Biopsy (stitched at 12o'clock) Tissue Cervix SURGICAL PATHOLOGY EXAM Miranda Rosenthal MD 5/12/2022 10:06 AM    2 : Endocervical Curettings Curettings Endocervix SURGICAL PATHOLOGY EXAM Miranda Rosenthal MD 5/12/2022 10:06 AM      Findings:  External genitalia and urethral meatus appears normal.  Vagina smooth without nodularity or masses.  Cervix with  lesion encompassing the entire anterior cervix, approximately 2 cm in size. Hemostasis achieved with circumferential cervical sutures, ball cautery and SurgiCell X 1.     Complications: None.  Implants: * No implants in log *

## 2022-05-12 NOTE — ANESTHESIA CARE TRANSFER NOTE
Patient: Jyoti Pitts    Procedure: Procedure(s):  Cervical Cone Biopsy, Endocervical Curettage       Diagnosis: Adenocarcinoma in situ (AIS) of uterine cervix [D06.9]  Diagnosis Additional Information: No value filed.    Anesthesia Type:   General     Note:    Oropharynx: oropharynx clear of all foreign objects  Level of Consciousness: drowsy  Oxygen Supplementation: face mask  Level of Supplemental Oxygen (L/min / FiO2): 6  Independent Airway: airway patency satisfactory and stable  Dentition: dentition unchanged  Vital Signs Stable: post-procedure vital signs reviewed and stable  Report to RN Given: handoff report given  Patient transferred to: PACU    Handoff Report: Identifed the Patient, Identified the Reponsible Provider, Reviewed the pertinent medical history, Discussed the surgical course, Reviewed Intra-OP anesthesia mangement and issues during anesthesia, Set expectations for post-procedure period and Allowed opportunity for questions and acknowledgement of understanding      Vitals:  Vitals Value Taken Time   BP     Temp     Pulse     Resp     SpO2         Electronically Signed By: SUSY Millan CRNA  May 12, 2022  10:38 AM

## 2022-05-12 NOTE — PROGRESS NOTES
"Gynecologic Oncology Postoperative Check Note  5/12/2022    S: Patient reports she is doing well postoperatively. Pain IS well controlled with Oral pain. Ambulating without pain medications. Voiding spontaneously. Tolerating crackers and water without nausea or vomiting. Denies chest pain, shortness of breath, dizziness, or other concerns at this time.     O:  Vitals:    05/12/22 0835 05/12/22 1036   BP: 103/63 104/69   Pulse: 67    Resp: 16 18   Temp: 97.5  F (36.4  C) 98.1  F (36.7  C)   TempSrc: Oral Oral   SpO2: 100% 100%   Weight: 62.3 kg (137 lb 5.6 oz)    Height: 1.6 m (5' 3\")        Gen: In no acute distress  Cardio: RRR, S1/S2, no murmurs  Resp: CTAB, no wheezing or crackles  Extremities: Non-tender, trace edema  : No current bleeding    A: 37 year old POD#0 s/p CKC and ECC. Doing well in immediate post-operative period.    Dz: AIS, awaiting final pathology from [facility]. Has follow-up with Dr. Cortez on 5/24.   FEN: Advance as tolerated  Pain: Tyleon, Ibuprofen, Oxycodone PRN  GI: Senna as needed  : voiding spontaneously    Dispo: To home    Kassandra Campos MD  Obstetrics and Gyncology, PGY-1  May 12, 2022 , 10:59 AM     "

## 2022-05-12 NOTE — OP NOTE
Gynecologic Oncology Operative Report    5/12/2022  Jyoti Pitts  [id number]    PREOPERATIVE DIAGNOSIS: AIS, CIN3    POSTOPERATIVE DIAGNOSIS: Same, final pathology pending    PROCEDURES: Cervical cold knife conization    SURGEON: Miranda Cortez MD     ASSISTANTS:  Kassandra Castellanos MD, PGY-1, Eliud Haynes MD, Fellow.     ANESTHESIA: MAC    ESTIMATED BLOOD LOSS: 50 cc     IV FLUIDS: See anesthesia record    URINE OUTPUT: Not measured     INDICATIONS: Jyoti Pitts is a 37 year old who had an abnormal pap smear 2 years ago while she was living in [place].  At that time she was unable to follow up until she moved to [place] and underwent a pap on 8/12/21 which was negative but had HPV16+, however re-read of the pap smear showed AGC-NOS.  She then underwent a colposcopy with biopsies on 3/30/22 which revealed CIN2 with changes suspicious for AIS.  She was then seen in clinic and her cervix was suspicious for invasive malignancy and thus a repeat biopsy was obtained which revealed AIS without evidence of invasive malignancy.  Following a thorough discussion of the risks, benefits, indications and alternatives she consented to the above procedure.    SPECIMENS:   1.  Cervical conization, stitch at 12 o'clock  2.  ECC    COMPLICATIONS: None.     CONDITION: Stable to PACU.     FINDINGS/PROCEDURE:   Consent was reviewed with the patient in the preoperative setting and confirmed. The patient was transferred to the operating room and placed in dorsal supine position. MAC was obtained in the usual manner without noted difficulties. The patient was then positioned onto Juvenal stirrups.  The patient was prepped and draped for the above-mentioned procedure.  Timeout was called at which point the patient's name, procedure and operative site was confirmed by the operative team. Speculum was placed in the vagina and the posterior lip of the cervix was grasped with a single toothed tenaculum.  The cervix was visualized and  there was a visible lesion at the anterior cervix replacing the majority of the cervix with some ectroprion as well.  This was friable and bled with slight manipulation.  Two stay sutures were placed at the 3 and 9 o'clock positions and used for traction throughout the procedure and the tenaculum was removed.  The cone biopsy was then obtained in the usual manner with the angled knife and marked with a stitch at 12 o'clock.  An ECC was obtained above the conization bed.  Hemostasis was then achieved with application of cautery and a baseball suture circumferentially.  A piece of SurgiCell was then placed in the conization bed.  The stay sutures were trimmed and the speculum was removed.  The patient tolerated the procedure well and was taken to the recovery room in stable condition.  Sponge, lap and needle counts were reported as correct x2 and instrument count was correct x1.     Miranda Cortez MD  Gynecologic Oncology  [facility]

## 2022-05-13 ENCOUNTER — PATIENT OUTREACH (OUTPATIENT)
Dept: ONCOLOGY | Facility: CLINIC | Age: 37
End: 2022-05-13
Payer: MEDICAID

## 2022-05-13 NOTE — PROGRESS NOTES
[facility]: Post-Discharge Note  SITUATION                                                      Admission:    Admission Date: 05/12/22   Reason for Admission: Cervical cold knife conization  Discharge:   Discharge Date: 05/12/22    BACKGROUND                                                      Per hospital discharge summary and inpatient provider notes:  AIS, CIN3    ASSESSMENT           Discharge Assessment  Do you feel your condition is stable enough to be safe at home until your provider visit?: Yes  Does the patient have their discharge instructions? : Yes  Does the patient have questions regarding their discharge instructions? : No  Discharge follow-up appointment scheduled within 14 calendar days? : Yes  Discharge Follow Up Appointment Date: 05/24/22  Discharge Follow Up Appointment Scheduled with?: Specialty Care Provider (Dr. Cortez)         Post-op (Clinicians Only)  Did the patient have surgery or a procedure: Yes  Bleeding: scant (vaginal drainage)  Fever: No  Chills: No  Closure: none  Eating & Drinking: eating and drinking without complaints/concerns  Urinary Status: voiding without complaint/concerns      PLAN                                                      Outpatient Plan:     Future Appointments   Date Time Provider Department Center   5/24/2022  1:30 PM Miranda Rosenthal MD [facility]         For any urgent concerns, please contact our 24 hour nurse triage line: [phone] ([phone])         Kira Bautista RN

## 2022-05-18 LAB
PATH REPORT.COMMENTS IMP SPEC: ABNORMAL
PATH REPORT.COMMENTS IMP SPEC: ABNORMAL
PATH REPORT.COMMENTS IMP SPEC: YES
PATH REPORT.FINAL DX SPEC: ABNORMAL
PATH REPORT.GROSS SPEC: ABNORMAL
PATH REPORT.MICROSCOPIC SPEC OTHER STN: ABNORMAL
PATH REPORT.MICROSCOPIC SPEC OTHER STN: ABNORMAL
PATH REPORT.RELEVANT HX SPEC: ABNORMAL
PATHOLOGY SYNOPTIC REPORT: ABNORMAL
PHOTO IMAGE: ABNORMAL

## 2022-05-18 PROCEDURE — 88342 IMHCHEM/IMCYTCHM 1ST ANTB: CPT | Mod: 26 | Performed by: PATHOLOGY

## 2022-05-18 PROCEDURE — 88307 TISSUE EXAM BY PATHOLOGIST: CPT | Mod: 26 | Performed by: PATHOLOGY

## 2022-05-18 PROCEDURE — 88305 TISSUE EXAM BY PATHOLOGIST: CPT | Mod: 26 | Performed by: PATHOLOGY

## 2022-05-19 ENCOUNTER — APPOINTMENT (OUTPATIENT)
Dept: INTERPRETER SERVICES | Facility: CLINIC | Age: 37
End: 2022-05-19
Payer: MEDICAID

## 2022-05-20 ENCOUNTER — PATIENT OUTREACH (OUTPATIENT)
Dept: ONCOLOGY | Facility: CLINIC | Age: 37
End: 2022-05-20
Payer: MEDICAID

## 2022-05-20 NOTE — PROGRESS NOTES
Patient called the scheduling line with questions regarding her biopsy and writer called back with the use of the  [phone]    Patient notes serosanguinous vaginal drainage and changing a pad every 4 hours. She has increased her activity and returned to work Writer encouraged her to decrease her activity if she is able to determine if the vaginal drainage decreases.     Patient asking for a copy of her surgical pathology that she had discussed with Dr. Cortez. Writer released to [facility].Patient expressed anxiety about the cancer diagnosis and what she should be doing now. Writer encouraged her to write down her questions and reassured the patient that Dr. Cortez will be reviewing the pathology report again with the patient and will be making recommendation at her visit on Tuesday. Patient verbalized understanding and plans to bring her sister to the visit on Tuesday. She had no further questions at this time.   Kira Bautista RN on 5/20/2022 at 12:44 PM

## 2022-05-24 ENCOUNTER — HOSPITAL ENCOUNTER (INPATIENT)
Facility: CLINIC | Age: 37
Setting detail: SURGERY ADMIT
End: 2022-05-24
Attending: OBSTETRICS & GYNECOLOGY | Admitting: OBSTETRICS & GYNECOLOGY
Payer: MEDICAID

## 2022-05-24 ENCOUNTER — ONCOLOGY VISIT (OUTPATIENT)
Dept: ONCOLOGY | Facility: CLINIC | Age: 37
End: 2022-05-24
Attending: OBSTETRICS & GYNECOLOGY
Payer: MEDICAID

## 2022-05-24 VITALS
HEART RATE: 73 BPM | HEIGHT: 63 IN | RESPIRATION RATE: 16 BRPM | TEMPERATURE: 97.6 F | WEIGHT: 139.5 LBS | BODY MASS INDEX: 24.72 KG/M2 | OXYGEN SATURATION: 99 % | DIASTOLIC BLOOD PRESSURE: 69 MMHG | SYSTOLIC BLOOD PRESSURE: 108 MMHG

## 2022-05-24 DIAGNOSIS — C53.8 MALIGNANT NEOPLASM OF OVERLAPPING SITES OF CERVIX (H): Primary | ICD-10-CM

## 2022-05-24 PROCEDURE — G0463 HOSPITAL OUTPT CLINIC VISIT: HCPCS

## 2022-05-24 PROCEDURE — 99214 OFFICE O/P EST MOD 30 MIN: CPT | Mod: 24 | Performed by: OBSTETRICS & GYNECOLOGY

## 2022-05-24 RX ORDER — PHENAZOPYRIDINE HYDROCHLORIDE 100 MG/1
200 TABLET, FILM COATED ORAL ONCE
Status: CANCELLED | OUTPATIENT
Start: 2022-05-24 | End: 2022-05-24

## 2022-05-24 RX ORDER — HEPARIN SODIUM 10000 [USP'U]/ML
5000 INJECTION, SOLUTION INTRAVENOUS; SUBCUTANEOUS
Status: CANCELLED | OUTPATIENT
Start: 2022-05-24

## 2022-05-24 RX ORDER — METRONIDAZOLE 500 MG/100ML
500 INJECTION, SOLUTION INTRAVENOUS
Status: CANCELLED | OUTPATIENT
Start: 2022-05-24

## 2022-05-24 ASSESSMENT — PAIN SCALES - GENERAL: PAINLEVEL: MILD PAIN (3)

## 2022-05-24 NOTE — PATIENT INSTRUCTIONS
PET and MRI now 6/7 and 6/14    Next visit in person with Dr Cortez following imaging studies 6/14  Kira Bautista RN on 6/3/2022 at 3:23 PM

## 2022-05-24 NOTE — LETTER
2022         RE: Jyoti Pitts  [address] [facility] [address]        Dear Colleague,    Thank you for referring your patient, Jyoti Pitts, to the [facility]. Please see a copy of my visit note below.    Consult Notes on Referred Patient              RE: Jyoti Pitts  : 1985  CHAI: May 24, 2022      HPI: Jyoti Pitts is 37 year old with at least stage IB adenocarcinoma of the cervix. She is accompanied today by her sister and a friend who is a retired gynecologist.  A [place] telephone  was used for the entire visit.  She is doing well post-conization and has no concerns today.  She has had minimal bleeding    Cancer Course:  She reports she had an abnormal pap smear in [place] 2 years ago.  She did not follow up and has moved to [place] recently so established care.  She has regular menses without abnormal bleeding.    21:  Pap:  Negative, HPV 16+    3/30/22:  Cervical biopsy:  CIN2 with changes suspicious for AIS    22:  Cervical conization   Pathology:  adenocarcinoma, HPV associated, tumor size at least 1.2 cm, depth 7 mm, no LVSI, + margins      Obstetrics and Gynecology History:  ,  x 2  Regular menses  She has completed child bearing      Past Medical History:  Past Medical History:   Diagnosis Date     Cervical cancer (H)        Past Surgical History:  Past Surgical History:   Procedure Laterality Date     DILATION AND CURETTAGE, CONIZATION, COMBINED N/A 2022    Procedure: Cervical Cone Biopsy, Endocervical Curettage;  Surgeon: Miranda Rosenthal MD;  Location:  OR       Health Maintenance:  Last Pap Smear: See HPI  She has had a history of abnormal Pap smears.    Last Mammogram: N/A    Last Colonoscopy: N/A       Social History:  Lives with her children, feels safe at home.  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  Does not have an advanced directive on file and would like her  "sister Alla to be her POA.  Full code    Family History:   The patient's family history is significant for.  Family History   Problem Relation Age of Onset     Cancer No family hx of          Physical Exam:   /69   Pulse 73   Temp 97.6  F (36.4  C) (Tympanic)   Resp 16   Ht 1.588 m (5' 2.5\")   Wt 63.3 kg (139 lb 8 oz)   LMP 05/01/2022 (Exact Date)   SpO2 99%   BMI 25.11 kg/m    Body mass index is 25.11 kg/m .    General Appearance: healthy and alert, no distress    Genitourinary: External genitalia and urethral meatus appears normal.  Vagina is smooth without nodularity or masses.  Cervix with a well healing conization site      Labs:  None      Assessment:    Jyoti Pitts is a 37 year old woman with at least stage 1B adenocarcinoma of the cervix.     A total of 30 minutes was spent on patient care today.       Plan:     1.)    At least stage IB adenocarcinoma of the cervix-we reviewed her surgical findings and pathology in detail.  We discussed the need for further imaging to assess both the local extent as well as metastatic disease with a pelvic MRI and PET/CT.  We reviewed pending these studies, the likely next step would be radical hysterectomy with lymph node assessment.  We discussed the [facility] trial which may be available soon.  Discussed rationale to wait at least 6 weeks from her previous procedure and we will thus hold a surgical date for her on 6/23.  She will return after completion of her MRI/PET for more detailed discussion of surgery and possible clinical trial enrollment.     2.) Genetic risk factors were assessed and the patient does not meet the qualifications for a referral.      3.) Labs and/or tests ordered include: MRI, PET     4.) Health maintenance issues addressed today include pt is up to date.           Thank you for allowing us to participate in the care of your patient.         Sincerely,    Miranda Cortez MD  Gynecologic Oncology  [facility]       CC             Again, thank you for allowing me to participate in the care of your patient.        Sincerely,        Fransico Cortez MD

## 2022-05-24 NOTE — PROGRESS NOTES
Consult Notes on Referred Patient              RE: Jyoti Pitts  : 1985  CHAI: May 24, 2022      HPI: Jyoti Pitts is 37 year old with at least stage IB adenocarcinoma of the cervix. She is accompanied today by her sister and a friend who is a retired gynecologist.  A [place] telephone  was used for the entire visit.  She is doing well post-conization and has no concerns today.  She has had minimal bleeding    Cancer Course:  She reports she had an abnormal pap smear in [place] 2 years ago.  She did not follow up and has moved to [place] recently so established care.  She has regular menses without abnormal bleeding.    21:  Pap:  Negative, HPV 16+    3/30/22:  Cervical biopsy:  CIN2 with changes suspicious for AIS    22:  Cervical conization   Pathology:  adenocarcinoma, HPV associated, tumor size at least 1.2 cm, depth 7 mm, no LVSI, + margins      Obstetrics and Gynecology History:  ,  x 2  Regular menses  She has completed child bearing      Past Medical History:  Past Medical History:   Diagnosis Date     Cervical cancer (H)        Past Surgical History:  Past Surgical History:   Procedure Laterality Date     DILATION AND CURETTAGE, CONIZATION, COMBINED N/A 2022    Procedure: Cervical Cone Biopsy, Endocervical Curettage;  Surgeon: Miranda Rosenthal MD;  Location:  OR       Health Maintenance:  Last Pap Smear: See HPI  She has had a history of abnormal Pap smears.    Last Mammogram: N/A    Last Colonoscopy: N/A       Social History:  Lives with her children, feels safe at home.  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  Does not have an advanced directive on file and would like her sister Alla to be her POA.  Full code    Family History:   The patient's family history is significant for.  Family History   Problem Relation Age of Onset     Cancer No family hx of          Physical Exam:   /69   Pulse 73   Temp 97.6  F (36.4  C) (Tympanic)    "Resp 16   Ht 1.588 m (5' 2.5\")   Wt 63.3 kg (139 lb 8 oz)   LMP 05/01/2022 (Exact Date)   SpO2 99%   BMI 25.11 kg/m    Body mass index is 25.11 kg/m .    General Appearance: healthy and alert, no distress    Genitourinary: External genitalia and urethral meatus appears normal.  Vagina is smooth without nodularity or masses.  Cervix with a well healing conization site      Labs:  None      Assessment:    Jyoti Pitts is a 37 year old woman with at least stage 1B adenocarcinoma of the cervix.     A total of 30 minutes was spent on patient care today.       Plan:     1.)    At least stage IB adenocarcinoma of the cervix-we reviewed her surgical findings and pathology in detail.  We discussed the need for further imaging to assess both the local extent as well as metastatic disease with a pelvic MRI and PET/CT.  We reviewed pending these studies, the likely next step would be radical hysterectomy with lymph node assessment.  We discussed the [facility] trial which may be available soon.  Discussed rationale to wait at least 6 weeks from her previous procedure and we will thus hold a surgical date for her on 6/23.  She will return after completion of her MRI/PET for more detailed discussion of surgery and possible clinical trial enrollment.     2.) Genetic risk factors were assessed and the patient does not meet the qualifications for a referral.      3.) Labs and/or tests ordered include: MRI, PET     4.) Health maintenance issues addressed today include pt is up to date.           Thank you for allowing us to participate in the care of your patient.         Sincerely,    Miranda Cortez MD  Gynecologic Oncology  [facility]       CC         "

## 2022-05-24 NOTE — NURSING NOTE
"Oncology Rooming Note    May 24, 2022 1:40 PM   Jyoti Pitts is a 37 year old female who presents for:    Chief Complaint   Patient presents with     Oncology Clinic Visit     Malignant neoplasm of overlapping sites of cervix        Initial Vitals: /69   Pulse 73   Temp 97.6  F (36.4  C) (Tympanic)   Resp 16   Ht 1.588 m (5' 2.5\")   Wt 63.3 kg (139 lb 8 oz)   LMP 05/01/2022 (Exact Date)   SpO2 99%   BMI 25.11 kg/m   Estimated body mass index is 25.11 kg/m  as calculated from the following:    Height as of this encounter: 1.588 m (5' 2.5\").    Weight as of this encounter: 63.3 kg (139 lb 8 oz). Body surface area is 1.67 meters squared.  Mild Pain (3) Comment: Data Unavailable   Patient's last menstrual period was 05/01/2022 (exact date).  Allergies reviewed: Yes  Medications reviewed: Yes    Medications: Medication refills not needed today.  Pharmacy name entered into [employer]: [id number] - [place], [place] - [address]    Clinical concerns: follow up       Terra Espino CMA              "

## 2022-05-26 ENCOUNTER — APPOINTMENT (OUTPATIENT)
Dept: INTERPRETER SERVICES | Facility: CLINIC | Age: 37
End: 2022-05-26
Payer: MEDICAID

## 2022-06-04 ENCOUNTER — DOCUMENTATION ONLY (OUTPATIENT)
Dept: ONCOLOGY | Facility: CLINIC | Age: 37
End: 2022-06-04
Payer: MEDICAID

## 2022-06-04 NOTE — PROGRESS NOTES
RN Care Coordinator: Roshni Engel; [phone]    Surgery is scheduled with Dr. Cortez on 6/30 at the [facility].  Scheduled per NEXT AVAILABLE.    H&P to be completed by Surgeon     COVID-19 test: 6/27 at [facility]     Post-op: will be scheduled by the clinic    Patient will receive a phone call from pre-admission nurses 1-2 days prior to surgery with arrival and start time.    I sent a message on eGood to confirm the scheduled information. Offered to call patient if they would like. Will watch for response via eGood, if no response/message not read - I will call.     Surgery packet was provided by the RN during appointment

## 2022-06-07 ENCOUNTER — ANCILLARY PROCEDURE (OUTPATIENT)
Dept: PET IMAGING | Facility: CLINIC | Age: 37
End: 2022-06-07
Attending: OBSTETRICS & GYNECOLOGY
Payer: MEDICAID

## 2022-06-07 DIAGNOSIS — C53.8 MALIGNANT NEOPLASM OF OVERLAPPING SITES OF CERVIX (H): ICD-10-CM

## 2022-06-07 LAB
CREAT BLD-MCNC: 0.6 MG/DL (ref 0.5–1.2)
GFR SERPL CREATININE-BSD FRML MDRD: >90 ML/MIN/{1.73_M2}
GLUCOSE SERPL-MCNC: 101 MG/DL (ref 70–99)

## 2022-06-07 RX ORDER — FUROSEMIDE 10 MG/ML
40 INJECTION INTRAMUSCULAR; INTRAVENOUS ONCE
Status: COMPLETED | OUTPATIENT
Start: 2022-06-07 | End: 2022-06-07

## 2022-06-07 RX ORDER — IOPAMIDOL 755 MG/ML
50-135 INJECTION, SOLUTION INTRAVASCULAR ONCE
Status: COMPLETED | OUTPATIENT
Start: 2022-06-07 | End: 2022-06-07

## 2022-06-07 RX ADMIN — FUROSEMIDE 40 MG: 10 INJECTION INTRAMUSCULAR; INTRAVENOUS at 14:04

## 2022-06-07 RX ADMIN — IOPAMIDOL 84 ML: 755 INJECTION, SOLUTION INTRAVASCULAR at 14:04

## 2022-06-14 ENCOUNTER — LAB (OUTPATIENT)
Dept: ONCOLOGY | Facility: CLINIC | Age: 37
End: 2022-06-14
Attending: OBSTETRICS & GYNECOLOGY
Payer: MEDICAID

## 2022-06-14 ENCOUNTER — HOSPITAL ENCOUNTER (OUTPATIENT)
Dept: MRI IMAGING | Facility: CLINIC | Age: 37
Discharge: HOME | End: 2022-06-14
Attending: OBSTETRICS & GYNECOLOGY | Admitting: OBSTETRICS & GYNECOLOGY
Payer: MEDICAID

## 2022-06-14 VITALS
BODY MASS INDEX: 24.57 KG/M2 | OXYGEN SATURATION: 98 % | DIASTOLIC BLOOD PRESSURE: 70 MMHG | RESPIRATION RATE: 16 BRPM | HEART RATE: 77 BPM | SYSTOLIC BLOOD PRESSURE: 112 MMHG | WEIGHT: 138.7 LBS | HEIGHT: 63 IN | TEMPERATURE: 98.5 F

## 2022-06-14 DIAGNOSIS — C53.9 MALIGNANT NEOPLASM OF CERVIX, UNSPECIFIED SITE (H): Primary | ICD-10-CM

## 2022-06-14 DIAGNOSIS — C53.8 MALIGNANT NEOPLASM OF OVERLAPPING SITES OF CERVIX (H): ICD-10-CM

## 2022-06-14 DIAGNOSIS — C53.9 MALIGNANT NEOPLASM OF CERVIX, UNSPECIFIED SITE (H): ICD-10-CM

## 2022-06-14 LAB
ABO/RH(D): NORMAL
ALBUMIN SERPL-MCNC: 3.3 G/DL (ref 3.4–5)
ALP SERPL-CCNC: 58 U/L (ref 40–150)
ALT SERPL W P-5'-P-CCNC: 20 U/L (ref 0–50)
ANION GAP SERPL CALCULATED.3IONS-SCNC: 4 MMOL/L (ref 3–14)
ANTIBODY SCREEN: NEGATIVE
AST SERPL W P-5'-P-CCNC: 14 U/L (ref 0–45)
BASOPHILS # BLD AUTO: 0 10E3/UL (ref 0–0.2)
BASOPHILS NFR BLD AUTO: 1 %
BILIRUB SERPL-MCNC: 0.3 MG/DL (ref 0.2–1.3)
BUN SERPL-MCNC: 15 MG/DL (ref 7–30)
CALCIUM SERPL-MCNC: 8.7 MG/DL (ref 8.5–10.1)
CHLORIDE BLD-SCNC: 105 MMOL/L (ref 94–109)
CO2 SERPL-SCNC: 27 MMOL/L (ref 20–32)
CREAT SERPL-MCNC: 0.54 MG/DL (ref 0.52–1.04)
EOSINOPHIL # BLD AUTO: 0.3 10E3/UL (ref 0–0.7)
EOSINOPHIL NFR BLD AUTO: 4 %
ERYTHROCYTE [DISTWIDTH] IN BLOOD BY AUTOMATED COUNT: 12.4 % (ref 10–15)
GFR SERPL CREATININE-BSD FRML MDRD: >90 ML/MIN/1.73M2
GLUCOSE BLD-MCNC: 94 MG/DL (ref 70–99)
HCT VFR BLD AUTO: 38.1 % (ref 35–47)
HGB BLD-MCNC: 12.6 G/DL (ref 11.7–15.7)
IMM GRANULOCYTES # BLD: 0 10E3/UL
IMM GRANULOCYTES NFR BLD: 0 %
LYMPHOCYTES # BLD AUTO: 1.9 10E3/UL (ref 0.8–5.3)
LYMPHOCYTES NFR BLD AUTO: 27 %
MCH RBC QN AUTO: 31.7 PG (ref 26.5–33)
MCHC RBC AUTO-ENTMCNC: 33.1 G/DL (ref 31.5–36.5)
MCV RBC AUTO: 96 FL (ref 78–100)
MONOCYTES # BLD AUTO: 0.5 10E3/UL (ref 0–1.3)
MONOCYTES NFR BLD AUTO: 8 %
NEUTROPHILS # BLD AUTO: 4.2 10E3/UL (ref 1.6–8.3)
NEUTROPHILS NFR BLD AUTO: 60 %
NRBC # BLD AUTO: 0 10E3/UL
NRBC BLD AUTO-RTO: 0 /100
PLATELET # BLD AUTO: 206 10E3/UL (ref 150–450)
POTASSIUM BLD-SCNC: 4.2 MMOL/L (ref 3.4–5.3)
PROT SERPL-MCNC: 7.4 G/DL (ref 6.8–8.8)
RBC # BLD AUTO: 3.97 10E6/UL (ref 3.8–5.2)
SODIUM SERPL-SCNC: 136 MMOL/L (ref 133–144)
SPECIMEN EXPIRATION DATE: NORMAL
WBC # BLD AUTO: 6.9 10E3/UL (ref 4–11)

## 2022-06-14 PROCEDURE — 255N000002 HC RX 255 OP 636: Performed by: OBSTETRICS & GYNECOLOGY

## 2022-06-14 PROCEDURE — 85048 AUTOMATED LEUKOCYTE COUNT: CPT | Performed by: OBSTETRICS & GYNECOLOGY

## 2022-06-14 PROCEDURE — 36415 COLL VENOUS BLD VENIPUNCTURE: CPT

## 2022-06-14 PROCEDURE — 86850 RBC ANTIBODY SCREEN: CPT | Performed by: OBSTETRICS & GYNECOLOGY

## 2022-06-14 PROCEDURE — 72197 MRI PELVIS W/O & W/DYE: CPT

## 2022-06-14 PROCEDURE — A9585 GADOBUTROL INJECTION: HCPCS | Performed by: OBSTETRICS & GYNECOLOGY

## 2022-06-14 PROCEDURE — 82040 ASSAY OF SERUM ALBUMIN: CPT | Performed by: OBSTETRICS & GYNECOLOGY

## 2022-06-14 PROCEDURE — 80053 COMPREHEN METABOLIC PANEL: CPT | Performed by: OBSTETRICS & GYNECOLOGY

## 2022-06-14 PROCEDURE — 86901 BLOOD TYPING SEROLOGIC RH(D): CPT | Performed by: OBSTETRICS & GYNECOLOGY

## 2022-06-14 PROCEDURE — 99214 OFFICE O/P EST MOD 30 MIN: CPT | Mod: 24 | Performed by: OBSTETRICS & GYNECOLOGY

## 2022-06-14 PROCEDURE — T1013 SIGN LANG/ORAL INTERPRETER: HCPCS | Mod: U3

## 2022-06-14 RX ORDER — GADOBUTROL 604.72 MG/ML
7.5 INJECTION INTRAVENOUS ONCE
Status: COMPLETED | OUTPATIENT
Start: 2022-06-14 | End: 2022-06-14

## 2022-06-14 RX ADMIN — GADOBUTROL 7.5 ML: 604.72 INJECTION INTRAVENOUS at 10:14

## 2022-06-14 ASSESSMENT — PAIN SCALES - GENERAL: PAINLEVEL: NO PAIN (0)

## 2022-06-14 NOTE — NURSING NOTE
"Oncology Rooming Note    June 14, 2022 2:08 PM   Jyoti Pitts is a 37 year old female who presents for:    Chief Complaint   Patient presents with     Oncology Clinic Visit     Malignant neoplasm of overlapping sites of cervix     Initial Vitals: /70   Pulse 77   Temp 98.5  F (36.9  C) (Tympanic)   Resp 16   Ht 1.588 m (5' 2.5\")   Wt 62.9 kg (138 lb 11.2 oz)   LMP 05/01/2022 (Exact Date)   SpO2 98%   BMI 24.96 kg/m   Estimated body mass index is 24.96 kg/m  as calculated from the following:    Height as of this encounter: 1.588 m (5' 2.5\").    Weight as of this encounter: 62.9 kg (138 lb 11.2 oz). Body surface area is 1.67 meters squared.  No Pain (0) Comment: Data Unavailable   Patient's last menstrual period was 05/01/2022 (exact date).  Allergies reviewed: Yes  Medications reviewed: Yes    Medications: Medication refills not needed today.  Pharmacy name entered into [employer]: [id number] - [place], [place] - [address]    Clinical concerns: follow up - concerned with lower back pain while bending.      Terra Espino [facility]              "

## 2022-06-14 NOTE — PATIENT INSTRUCTIONS
You have been scheduled for surgery on: 6/30    Diagnosis:  Cervical cancer    The surgical procedure is: Robotic vs open radical hysterectomy, bilateral salpingectomy, sentinel lymph node dissection, possible full lymph node dissection, cystoscopy    Anticipated length of surgery: 3-4 hrs.  You will be in the recovery room for approximately 2-3 hrs after surgery.  Your family will not be able to see you until after you leave the recovery room.    Length of hospital stay:  This is an outpatient, extended stay surgery.  You will be discharged same day if you meet criteria for discharge.  If you have a larger surgical incision, you will need to be in the hospital 2-3 days.  ______________________________________________________________________    Preparation for Surgery:    To Schedule   1.  Labs:  CBC, CMP, T/S, orders placed, please perform today   2.  Post-operative exam with me 1-2 weeks following surgery      Postoperative Restrictions:  No heavy lifting >20lbs for six weeks, nothing in the vagina (no tampons, no intercourse, no douching) for eight weeks.     Postoperative visit:  Return to clinic 1-2 weeks after surgery for post operative visit.      Please contact the clinic with any questions or concerns.    Miranda Cortez MD  Gynecologic Oncology  [facility]

## 2022-06-14 NOTE — PROGRESS NOTES
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 She is accompanied today by her sister.  A [place]uvian telephone  was used for the entire visit. She has no new concerns today.    Cancer Course:  She reports she had an abnormal pap smear in [place] 2 years ago.  She did not follow up and has moved to [place] recently so established care.  She has regular menses without abnormal bleeding.    21:  Pap:  Negative, HPV 16+    3/30/22:  Cervical biopsy:  CIN2 with changes suspicious for AIS    22:  Cervical conization   Pathology:  adenocarcinoma, HPV associated, tumor size at least 1.2 cm, depth 7 mm, no LVSI, + margins    22:  PET/CT (personally reviewed):  1.  Hypermetabolic cervical mass compatible with biopsy-proven malignancy. 2.  No evidence for local or distant metastasis in the chest, abdomen or pelvis. 3.  Mild bilateral ovarian uptake is presumably physiologic in this premenopausal patient.    22:  MRI Pelvis (personally reviewed): 1. Mildly heterogenous T2 hypointense appearance of the cervix, could be related to the known cervical cancer. Otherwise the known cervical cancer is not well-seen. 2. No convincing evidence of metastatic disease in the pelvis. 3. Small predominantly submucosal/intramural uterine fibroid and multiple cervical nabothian cysts. 4. Small to moderate amount of free fluid in the pelvis, indeterminate, could be physiological.    Obstetrics and Gynecology History:  ,  x 2  Regular menses  She has completed child bearing      Past Medical History:  Past Medical History:   Diagnosis Date     Cervical cancer (H)        Past Surgical History:  Past Surgical History:   Procedure Laterality Date     DILATION AND CURETTAGE, CONIZATION, COMBINED N/A 2022    Procedure: Cervical Cone Biopsy, Endocervical  "Curettage;  Surgeon: Miranda Rosenthal MD;  Location:  OR       Health Maintenance:  Last Pap Smear: See HPI  She has had a history of abnormal Pap smears.    Last Mammogram: N/A    Last Colonoscopy: N/A       Social History:  Lives with her children, feels safe at home.  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  Does not have an advanced directive on file and would like her sister Alla to be her POA.  Full code    Family History:   The patient's family history is significant for.  Family History   Problem Relation Age of Onset     Cancer No family hx of          Physical Exam:   /70   Pulse 77   Temp 98.5  F (36.9  C) (Tympanic)   Resp 16   Ht 1.588 m (5' 2.5\")   Wt 62.9 kg (138 lb 11.2 oz)   LMP 05/01/2022 (Exact Date)   SpO2 98%   BMI 24.96 kg/m    Body mass index is 24.96 kg/m .  General Appearance: healthy and alert, no distress       Cardiovascular: regular rate and rhythm, no gallops, rubs or murmurs     Respiratory: lungs clear, no rales, rhonchi or wheezes, normal diaphragmatic excursion      Labs:  WBC 6.9 with ANC 4.2.  Hemoglobin 12.6.  Platelets 206.  Creatinine 0.54.  Potassium 4.2.  Magnesium -.  Remainder of electrolytes within normal limits.  AST 14, ALT 20, alkaline phosphatase 58, total bilirubin 0.3.  Albumin 3.3.     Assessment:    Jyoti Pitts is a 37 year old woman with at least stage 1B adenocarcinoma of the cervix.     A total of 30 minutes was spent on patient care today.       Plan:     1.)    At least stage IB adenocarcinoma of the cervix-PET and MRI reviewed showing disease limited to the cervix appropriate for surgical resection.  We thus discussed the staging procedure to begin with sentinel lymph node assessment. We discussed if a sentinel cannot be identified then we would proceed with full pelvic lymphadenectomy. If no visibly positive lymph nodes, plan would be to proceed with radical hysterectomy with removal of uterus, cervix, bilateral fallopian " tubes, bilateral parametria and upper vagina.  We discussed the differences between a radical and simple hysterectomy and the increased complications/side effects of the radical procedure.  We discussed the risks and benefits of ovarian preservation/removal at the time of surgery and given her age, I recommend ovarian preservation if they are grossly normal intra-operatively.  We discussed the recent data on surgical approach as well as the currently available [facility] trial. She is still undecided about proceeding with the clinical trial vs a standard open approach and will call when she has decided how she would like to proceed Risks, benefits, indications and alternatives were reviewed.  She will undergo robotic vs open radical hysterectomy with removal of uterus, cervix, parametria, upper vagina, bilateral removal of fallopian tubes, sentinel lymph node dissection, possible full lymph node dissection, cystoscopy on 6/30.     2.) Genetic risk factors were assessed and the patient does not meet the qualifications for a referral.      3.) Labs and/or tests ordered include: CBC, CMP, T/S     4.) Health maintenance issues addressed today include pt is up to date.    5.) Pre-operative teaching was completed today           Thank you for allowing us to participate in the care of your patient.         Sincerely,    Miranda Cortez MD  Gynecologic Oncology  [facility]       CC

## 2022-06-14 NOTE — PROGRESS NOTES
Medical Assistant Note:  Jyoti Wheelerjo presents today for blood draw.    Patient seen by provider today: Yes: .   present during visit today: Not Applicable.    Concerns: No Concerns.    Procedure:  Lab draw site: left antecub, Needle type: butterfly, Gauge: 21.    Post Assessment:  Labs drawn without difficulty: Yes.    Discharge Plan:  Departure Mode: Ambulatory.    Face to Face Time: 10.    Mahi Ford, CMA

## 2022-06-14 NOTE — LETTER
2022         RE: Jyoti Pitts  [address] [place] [address]        Dear Colleague,    Thank you for referring your patient, Jyoti Pitts, to the [facility]. Please see a copy of my visit note below.    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 She is accompanied today by her sister.  A [place]onian telephone  was used for the entire visit. She has no new concerns today.    Cancer Course:  She reports she had an abnormal pap smear in [place] 2 years ago.  She did not follow up and has moved to [place] recently so established care.  She has regular menses without abnormal bleeding.    21:  Pap:  Negative, HPV 16+    3/30/22:  Cervical biopsy:  CIN2 with changes suspicious for AIS    22:  Cervical conization   Pathology:  adenocarcinoma, HPV associated, tumor size at least 1.2 cm, depth 7 mm, no LVSI, + margins    22:  PET/CT (personally reviewed):  1.  Hypermetabolic cervical mass compatible with biopsy-proven malignancy. 2.  No evidence for local or distant metastasis in the chest, abdomen or pelvis. 3.  Mild bilateral ovarian uptake is presumably physiologic in this premenopausal patient.    22:  MRI Pelvis (personally reviewed): 1. Mildly heterogenous T2 hypointense appearance of the cervix, could be related to the known cervical cancer. Otherwise the known cervical cancer is not well-seen. 2. No convincing evidence of metastatic disease in the pelvis. 3. Small predominantly submucosal/intramural uterine fibroid and multiple cervical nabothian cysts. 4. Small to moderate amount of free fluid in the pelvis, indeterminate, could be physiological.    Obstetrics and Gynecology History:  ,  x 2  Regular menses  She has completed child bearing      Past  "Medical History:  Past Medical History:   Diagnosis Date     Cervical cancer (H)        Past Surgical History:  Past Surgical History:   Procedure Laterality Date     DILATION AND CURETTAGE, CONIZATION, COMBINED N/A 5/12/2022    Procedure: Cervical Cone Biopsy, Endocervical Curettage;  Surgeon: Miranda Rosenthal MD;  Location:  OR       Health Maintenance:  Last Pap Smear: See HPI  She has had a history of abnormal Pap smears.    Last Mammogram: N/A    Last Colonoscopy: N/A       Social History:  Lives with her children, feels safe at home.  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  Does not have an advanced directive on file and would like her sister Alla to be her POA.  Full code    Family History:   The patient's family history is significant for.  Family History   Problem Relation Age of Onset     Cancer No family hx of          Physical Exam:   /70   Pulse 77   Temp 98.5  F (36.9  C) (Tympanic)   Resp 16   Ht 1.588 m (5' 2.5\")   Wt 62.9 kg (138 lb 11.2 oz)   LMP 05/01/2022 (Exact Date)   SpO2 98%   BMI 24.96 kg/m    Body mass index is 24.96 kg/m .  General Appearance: healthy and alert, no distress       Cardiovascular: regular rate and rhythm, no gallops, rubs or murmurs     Respiratory: lungs clear, no rales, rhonchi or wheezes, normal diaphragmatic excursion      Labs:  WBC 6.9 with ANC 4.2.  Hemoglobin 12.6.  Platelets 206.  Creatinine 0.54.  Potassium 4.2.  Magnesium -.  Remainder of electrolytes within normal limits.  AST 14, ALT 20, alkaline phosphatase 58, total bilirubin 0.3.  Albumin 3.3.     Assessment:    Jyoti Pitts is a 37 year old woman with at least stage 1B adenocarcinoma of the cervix.     A total of 30 minutes was spent on patient care today.       Plan:     1.)    At least stage IB adenocarcinoma of the cervix-PET and MRI reviewed showing disease limited to the cervix appropriate for surgical resection.  We thus discussed the staging procedure to begin with " sentinel lymph node assessment. We discussed if a sentinel cannot be identified then we would proceed with full pelvic lymphadenectomy. If no visibly positive lymph nodes, plan would be to proceed with radical hysterectomy with removal of uterus, cervix, bilateral fallopian tubes, bilateral parametria and upper vagina.  We discussed the differences between a radical and simple hysterectomy and the increased complications/side effects of the radical procedure.  We discussed the risks and benefits of ovarian preservation/removal at the time of surgery and given her age, I recommend ovarian preservation if they are grossly normal intra-operatively.  We discussed the recent data on surgical approach as well as the currently available [facility] trial. She is still undecided about proceeding with the clinical trial vs a standard open approach and will call when she has decided how she would like to proceed Risks, benefits, indications and alternatives were reviewed.  She will undergo robotic vs open radical hysterectomy with removal of uterus, cervix, parametria, upper vagina, bilateral removal of fallopian tubes, sentinel lymph node dissection, possible full lymph node dissection, cystoscopy on 6/30.     2.) Genetic risk factors were assessed and the patient does not meet the qualifications for a referral.      3.) Labs and/or tests ordered include: CBC, CMP, T/S     4.) Health maintenance issues addressed today include pt is up to date.    5.) Pre-operative teaching was completed today           Thank you for allowing us to participate in the care of your patient.         Sincerely,    Miranda Cortez MD  Gynecologic Oncology  [facility]       CC             Again, thank you for allowing me to participate in the care of your patient.        Sincerely,        Fransico Cortez MD

## 2022-06-26 ENCOUNTER — HEALTH MAINTENANCE LETTER (OUTPATIENT)
Age: 37
End: 2022-06-26

## 2022-06-28 ENCOUNTER — PREP FOR PROCEDURE (OUTPATIENT)
Dept: ONCOLOGY | Facility: CLINIC | Age: 37
End: 2022-06-28

## 2022-06-29 ENCOUNTER — DOCUMENTATION ONLY (OUTPATIENT)
Dept: ONCOLOGY | Facility: CLINIC | Age: 37
End: 2022-06-29

## 2022-07-02 ENCOUNTER — PATIENT OUTREACH (OUTPATIENT)
Dept: ONCOLOGY | Facility: CLINIC | Age: 37
End: 2022-07-02

## 2022-07-02 NOTE — PROGRESS NOTES
Late entry from 6/14/2022:  Met with pt and sister after clinic visit with Dr Cortez for surgical education. Labs completed in the clinic and pt will have have Covid test on 6/27/2022.    GYN ONC Pre-Op Education - Nursing     Relevant Diagnosis: Cervical Cancer    Teaching Topic: Education for Robotic versus open radical hysterectomy, removal of uterus, cervix, upper vagina, bilateral salpingectomy, sentinel LND, possible full LND, bilateral parametria with Dr Cortez on 6/30/2022.    Teaching Concerns addressed: Yes    Patient and sister demonstrate understanding of the following:   Reason for the appointment, diagnosis and treatment plan:   Yes   Knowledge of proper use of medications and conditions for which they are ordered (with special attention to potential side effects or drug interactions): Yes   Which situations necessitate calling provider and whom to contact: Yes       Nutritional needs and diet plan:  Yes      Pain management techniques:  Yes  Diet:  Yes     Infection Prevention:  Patient and sister demonstrate understanding of the following:   Pre-Op CHG Bathing Instructions: Yes  Surgical procedure site care taught:   Yes   Signs and symptoms of infection taught: Yes     Instructional Materials Used/Given:  [place] Preparing for Your Surgery  Showering or Bathing before Surgery Instructions & CHG Product (2 bottles)  Home Care after Major Abdominal or Vaginal Surgery  Map  Tips to Increase the Protein in Your Diet  Phone number for [facility]     Comments:  Met with pt for pre-op teaching for surgery on 6/14/2022 in the clinic.  Reviewed NPO restrictions prior to surgery with pt (No food or milk products 8 hours prior to surgery; Only clear liquids up to 2 hours prior to surgery i.e., water, black coffee, tea, apple juice).  Also reviewed medications that must be held for at least 7 days prior to surgery (Aspirin, Ibuprofen, Naproxen, Fish oil/Flax seed oil, Multivitamin/Vit. E, or  any other product containing aspirin, or NSAIDs).  Per Dr Cortez, ot does not have routine medications to take on morning of surgery. Pt will need to see Dr Cortez, 1-2 weeks after her surgery. Pt will need someone to drive her home after surgery, and someone to stay with her for at least 24 hours after she arrives home. Reviewed with pt signs and symptoms that would warrant contacting provider immediately.  Also reviewed lifting restrictions after surgery with pt and all instructions per AVS per Dr Cortez. Pt had the opportunity to ask questions, and all questions were answered to her satisfaction. All instructions were completed with  and pt's sister also assisted with instructions.  Pt able to understand and speak minimal english. Patient verbalized understanding and agreement with plan.  Pt was instructed to call the clinic with any questions, concerns, or worsening symptoms.     Roshni Engel RN, BSN, OCN

## 2022-10-19 NOTE — PROGRESS NOTES
Dana Bateman, Miranda Martins MD  You;  Cancer Clinic Rn Pool 6 minutes ago (10:32 AM)     CR    Correct, I do not need to see her tomorrow, just surgery Thursday.    Message text      Writer returned call to Jyoti with assistance from  to relay the above information. Jyoti understands that there is no need for an appointment with Dr. Cortez tomorrow, but she will get her COVID swab done at 11:15am.    Carey Adkins, RN, BSN, OCN  Nurse Care Coordinator  [facility] [place]  P: [phone]     F: [phone]       Hemigard Postcare Instructions: The HEMIGARD strips are to remain completely dry for at least 5-7 days.

## 2022-11-21 ENCOUNTER — HEALTH MAINTENANCE LETTER (OUTPATIENT)
Age: 37
End: 2022-11-21

## 2022-12-19 ENCOUNTER — APPOINTMENT (OUTPATIENT)
Dept: GENERAL RADIOLOGY | Facility: CLINIC | Age: 37
End: 2022-12-19
Attending: EMERGENCY MEDICINE
Payer: MEDICAID

## 2022-12-19 ENCOUNTER — HOSPITAL ENCOUNTER (EMERGENCY)
Facility: CLINIC | Age: 37
Discharge: HOME | End: 2022-12-19
Attending: EMERGENCY MEDICINE | Admitting: EMERGENCY MEDICINE
Payer: MEDICAID

## 2022-12-19 VITALS
OXYGEN SATURATION: 100 % | SYSTOLIC BLOOD PRESSURE: 117 MMHG | RESPIRATION RATE: 16 BRPM | TEMPERATURE: 97.2 F | HEART RATE: 64 BPM | DIASTOLIC BLOOD PRESSURE: 72 MMHG

## 2022-12-19 DIAGNOSIS — R07.9 CHEST PAIN, UNSPECIFIED TYPE: ICD-10-CM

## 2022-12-19 LAB
ALBUMIN SERPL BCG-MCNC: 4.3 G/DL (ref 3.5–5.2)
ALP SERPL-CCNC: 64 U/L (ref 35–104)
ALT SERPL W P-5'-P-CCNC: ABNORMAL U/L
ANION GAP SERPL CALCULATED.3IONS-SCNC: 12 MMOL/L (ref 7–15)
AST SERPL W P-5'-P-CCNC: ABNORMAL U/L
BASOPHILS # BLD AUTO: 0 10E3/UL (ref 0–0.2)
BASOPHILS NFR BLD AUTO: 1 %
BILIRUB SERPL-MCNC: 0.3 MG/DL
BUN SERPL-MCNC: 12.3 MG/DL (ref 6–20)
CALCIUM SERPL-MCNC: 9.4 MG/DL (ref 8.6–10)
CHLORIDE SERPL-SCNC: 101 MMOL/L (ref 98–107)
CREAT SERPL-MCNC: 0.59 MG/DL (ref 0.51–0.95)
DEPRECATED HCO3 PLAS-SCNC: 24 MMOL/L (ref 22–29)
EOSINOPHIL # BLD AUTO: 0.3 10E3/UL (ref 0–0.7)
EOSINOPHIL NFR BLD AUTO: 4 %
ERYTHROCYTE [DISTWIDTH] IN BLOOD BY AUTOMATED COUNT: 13 % (ref 10–15)
GFR SERPL CREATININE-BSD FRML MDRD: >90 ML/MIN/1.73M2
GLUCOSE SERPL-MCNC: 92 MG/DL (ref 70–99)
HCT VFR BLD AUTO: 41.6 % (ref 35–47)
HGB BLD-MCNC: 13.3 G/DL (ref 11.7–15.7)
HOLD SPECIMEN: NORMAL
HOLD SPECIMEN: NORMAL
IMM GRANULOCYTES # BLD: 0 10E3/UL
IMM GRANULOCYTES NFR BLD: 0 %
LYMPHOCYTES # BLD AUTO: 2.4 10E3/UL (ref 0.8–5.3)
LYMPHOCYTES NFR BLD AUTO: 34 %
MCH RBC QN AUTO: 30.5 PG (ref 26.5–33)
MCHC RBC AUTO-ENTMCNC: 32 G/DL (ref 31.5–36.5)
MCV RBC AUTO: 95 FL (ref 78–100)
MONOCYTES # BLD AUTO: 0.5 10E3/UL (ref 0–1.3)
MONOCYTES NFR BLD AUTO: 7 %
NEUTROPHILS # BLD AUTO: 3.7 10E3/UL (ref 1.6–8.3)
NEUTROPHILS NFR BLD AUTO: 54 %
NRBC # BLD AUTO: 0 10E3/UL
NRBC BLD AUTO-RTO: 0 /100
PLATELET # BLD AUTO: 240 10E3/UL (ref 150–450)
POTASSIUM SERPL-SCNC: 5 MMOL/L (ref 3.4–5.3)
PROT SERPL-MCNC: 8.5 G/DL (ref 6.4–8.3)
RBC # BLD AUTO: 4.36 10E6/UL (ref 3.8–5.2)
SODIUM SERPL-SCNC: 137 MMOL/L (ref 136–145)
TROPONIN T SERPL HS-MCNC: <6 NG/L
WBC # BLD AUTO: 6.9 10E3/UL (ref 4–11)

## 2022-12-19 PROCEDURE — 71046 X-RAY EXAM CHEST 2 VIEWS: CPT

## 2022-12-19 PROCEDURE — 84155 ASSAY OF PROTEIN SERUM: CPT | Performed by: EMERGENCY MEDICINE

## 2022-12-19 PROCEDURE — 36415 COLL VENOUS BLD VENIPUNCTURE: CPT | Performed by: EMERGENCY MEDICINE

## 2022-12-19 PROCEDURE — 85025 COMPLETE CBC W/AUTO DIFF WBC: CPT | Performed by: EMERGENCY MEDICINE

## 2022-12-19 PROCEDURE — 99285 EMERGENCY DEPT VISIT HI MDM: CPT | Mod: 25

## 2022-12-19 PROCEDURE — 93005 ELECTROCARDIOGRAM TRACING: CPT

## 2022-12-19 PROCEDURE — 84484 ASSAY OF TROPONIN QUANT: CPT | Performed by: EMERGENCY MEDICINE

## 2022-12-19 ASSESSMENT — ENCOUNTER SYMPTOMS
FEVER: 0
CHEST TIGHTNESS: 1
COUGH: 0
SHORTNESS OF BREATH: 1
NAUSEA: 1

## 2022-12-20 LAB
ATRIAL RATE - MUSE: 65 BPM
DIASTOLIC BLOOD PRESSURE - MUSE: NORMAL MMHG
INTERPRETATION ECG - MUSE: NORMAL
P AXIS - MUSE: 60 DEGREES
PR INTERVAL - MUSE: 152 MS
QRS DURATION - MUSE: 78 MS
QT - MUSE: 414 MS
QTC - MUSE: 430 MS
R AXIS - MUSE: 68 DEGREES
SYSTOLIC BLOOD PRESSURE - MUSE: NORMAL MMHG
T AXIS - MUSE: 53 DEGREES
VENTRICULAR RATE- MUSE: 65 BPM

## 2022-12-20 NOTE — ED TRIAGE NOTES
Pt states she's had chest pain/SOB, and palpitations off and on since last night. Denies cough or fevers. ABC's intact, alert and oriented X3.

## 2022-12-20 NOTE — ED PROVIDER NOTES
History   Chief Complaint:  Chest Pain and Shortness of Breath       The history is provided by the patient. The history is limited by a language barrier.      Jyoti Pitts is a 37 year old female, 6 months s/p hysterectomy as cervical cancer treatment, anticoagulated on Eliquis, who presents with intermittent pressure in her chest beginning 2 days ago.  The chest pain is intermittent and will last up to 3 hours per episode.  Pain is substernal, nonradiating and without aggravating factors. She reports shortness of breath with these episodes, feeling the need to take deep breaths. Nothing helps relieve the pressure except breathing deeply. She has no active chest pain at this time.  She reports some nausea. Denies fever or cough. She notes a Hx of cervical cancer but reports that this is in remission. Denies chemotherapy or radiation therapy. Denies recent travel, Hx of PE/DVT, coughing up blood, or daily estrogen medications.     Review of Systems   Constitutional: Negative for fever.   Respiratory: Positive for chest tightness and shortness of breath. Negative for cough.    Gastrointestinal: Positive for nausea.   All other systems reviewed and are negative.    Allergies:  The patient has no known allergies.     Medications:  Eliquis    Past Medical History:     Cervical cancer    Past Surgical History:    Dilation and curettage   Hysterectomy  Salpingectomy  Lymphadenectomy pelvic     Social History:  The patient presents to the ED with her daughter via private vehicle.  PCP: No Ref-Primary, Physician       Physical Exam     Patient Vitals for the past 24 hrs:   BP Temp Temp src Pulse Resp SpO2   12/19/22 2044 -- -- -- -- -- 100 %   12/19/22 2043 117/72 -- -- 64 -- --   12/19/22 1810 134/78 97.2  F (36.2  C) Temporal 66 16 100 %       Physical Exam    Eyes:    Conjunctiva normal  Neck:    Supple, no meningismus.     CV:     Regular rate and rhythm.      No murmurs, rubs or gallops.       No unilateral  leg swelling.       2+ radial pulses bilateral.       No lower extremity edema.  PULM:    Clear to auscultation bilateral.       No respiratory distress.      Good air exchange.     No rales or wheezing.     No stridor.  ABD:    Soft, non-tender, non-distended.       No pulsatile masses.       No rebound, guarding or rigidity.  MSK:     No gross deformity to all four extremities.   LYMPH:   No cervical lymphadenopathy.  NEURO:   Alert. Good muscle tone, no atrophy.  Skin:    Warm, dry and intact.    Psych:    Mood is good and affect is appropriate.        Emergency Department Course   ECG  ECG taken at 1818, ECG read at 2011  Normal sinus rhythm. Normal ECG.  Rate 65 bpm. IL interval 152 ms. QRS duration 78 ms. QT/QTc 414/430 ms. P-R-T axes 60 68 53.     Imaging:  Chest XR,  PA & LAT   Final Result   IMPRESSION:       Heart size is normal. Lungs are clear bilaterally. Mediastinum and visualized bony structures are unremarkable.        Report per radiology    Laboratory:  Labs Ordered and Resulted from Time of ED Arrival to Time of ED Departure   COMPREHENSIVE METABOLIC PANEL - Abnormal       Result Value    Sodium 137      Potassium 5.0      Chloride 101      Carbon Dioxide (CO2) 24      Anion Gap 12      Urea Nitrogen 12.3      Creatinine 0.59      Calcium 9.4      Glucose 92      Alkaline Phosphatase 64      AST        ALT        Protein Total 8.5 (*)     Albumin 4.3      Bilirubin Total 0.3      GFR Estimate >90     TROPONIN T, HIGH SENSITIVITY - Normal    Troponin T, High Sensitivity <6     CBC WITH PLATELETS AND DIFFERENTIAL    WBC Count 6.9      RBC Count 4.36      Hemoglobin 13.3      Hematocrit 41.6      MCV 95      MCH 30.5      MCHC 32.0      RDW 13.0      Platelet Count 240      % Neutrophils 54      % Lymphocytes 34      % Monocytes 7      % Eosinophils 4      % Basophils 1      % Immature Granulocytes 0      NRBCs per 100 WBC 0      Absolute Neutrophils 3.7      Absolute Lymphocytes 2.4      Absolute  Monocytes 0.5      Absolute Eosinophils 0.3      Absolute Basophils 0.0      Absolute Immature Granulocytes 0.0      Absolute NRBCs 0.0        Emergency Department Course:       Reviewed:  I reviewed nursing notes, vitals, past medical history and Care Everywhere    Assessments:   I obtained history and examined the patient as noted above. She was agreeable to discharge.    Disposition:  The patient was discharged to home.     Impression & Plan     Medical Decision Makin-year-old female seen in the ED with atypical chest pain.  EKG nonischemic.  Troponin within normal limits.  Given duration of symptoms, no indication for serial enzymes.  Very low suspicion for pulmonary embolism.  She has a history of cervical cancer but reportedly in remission.  She is on Eliquis for unknown reasons.  She has no additional risk factors to draw concern for pulmonary embolism.  Given presence of Eliquis, no active chest discomfort and lack of additional findings concerning for PE, no indication for D-dimer or CT scan of her chest.  Differential diagnosis would include atypical reflux, esophageal spasm, costochondritis, pleurisy, anxiety.  Close follow-up with primary care physician and return to ED for any worsening symptoms.    Diagnosis:    ICD-10-CM    1. Chest pain, unspecified type  R07.9           Discharge Medications:  Discharge Medication List as of 2022  8:40 PM          Scribe Disclosure:  I, Mary Jimenez, am serving as a scribe at 8:04 PM on 2022 to document services personally performed by Ángel Schultz MD based on my observations and the provider's statements to me.          Ángel Schultz MD  22 [id number]

## 2022-12-20 NOTE — DISCHARGE INSTRUCTIONS
Please make an appointment to follow up with your primary care provider in 2-3 days even if entirely better.

## 2023-07-09 ENCOUNTER — HEALTH MAINTENANCE LETTER (OUTPATIENT)
Age: 38
End: 2023-07-09

## 2023-07-19 ENCOUNTER — HOSPITAL ENCOUNTER (EMERGENCY)
Facility: CLINIC | Age: 38
Discharge: HOME | End: 2023-07-19
Attending: EMERGENCY MEDICINE | Admitting: EMERGENCY MEDICINE
Payer: MEDICAID

## 2023-07-19 VITALS
TEMPERATURE: 97.1 F | OXYGEN SATURATION: 98 % | SYSTOLIC BLOOD PRESSURE: 99 MMHG | RESPIRATION RATE: 18 BRPM | HEART RATE: 76 BPM | DIASTOLIC BLOOD PRESSURE: 58 MMHG

## 2023-07-19 DIAGNOSIS — R51.9 RECURRENT HEADACHE: ICD-10-CM

## 2023-07-19 DIAGNOSIS — R11.2 NAUSEA AND VOMITING, UNSPECIFIED VOMITING TYPE: ICD-10-CM

## 2023-07-19 LAB
ALBUMIN SERPL BCG-MCNC: 4.2 G/DL (ref 3.5–5.2)
ALP SERPL-CCNC: 64 U/L (ref 35–104)
ALT SERPL W P-5'-P-CCNC: 20 U/L (ref 0–50)
ANION GAP SERPL CALCULATED.3IONS-SCNC: 8 MMOL/L (ref 7–15)
AST SERPL W P-5'-P-CCNC: 24 U/L (ref 0–45)
BASOPHILS # BLD AUTO: 0.1 10E3/UL (ref 0–0.2)
BASOPHILS NFR BLD AUTO: 1 %
BILIRUB SERPL-MCNC: 0.2 MG/DL
BUN SERPL-MCNC: 13.9 MG/DL (ref 6–20)
CALCIUM SERPL-MCNC: 9.2 MG/DL (ref 8.6–10)
CHLORIDE SERPL-SCNC: 106 MMOL/L (ref 98–107)
CREAT SERPL-MCNC: 0.57 MG/DL (ref 0.51–0.95)
DEPRECATED HCO3 PLAS-SCNC: 23 MMOL/L (ref 22–29)
EOSINOPHIL # BLD AUTO: 0.1 10E3/UL (ref 0–0.7)
EOSINOPHIL NFR BLD AUTO: 2 %
ERYTHROCYTE [DISTWIDTH] IN BLOOD BY AUTOMATED COUNT: 12.6 % (ref 10–15)
FLUAV RNA SPEC QL NAA+PROBE: NEGATIVE
FLUBV RNA RESP QL NAA+PROBE: NEGATIVE
GFR SERPL CREATININE-BSD FRML MDRD: >90 ML/MIN/1.73M2
GLUCOSE SERPL-MCNC: 120 MG/DL (ref 70–99)
HCT VFR BLD AUTO: 38.8 % (ref 35–47)
HGB BLD-MCNC: 13.2 G/DL (ref 11.7–15.7)
IMM GRANULOCYTES # BLD: 0 10E3/UL
IMM GRANULOCYTES NFR BLD: 1 %
LIPASE SERPL-CCNC: 41 U/L (ref 13–60)
LYMPHOCYTES # BLD AUTO: 1.3 10E3/UL (ref 0.8–5.3)
LYMPHOCYTES NFR BLD AUTO: 15 %
MCH RBC QN AUTO: 32 PG (ref 26.5–33)
MCHC RBC AUTO-ENTMCNC: 34 G/DL (ref 31.5–36.5)
MCV RBC AUTO: 94 FL (ref 78–100)
MONOCYTES # BLD AUTO: 0.5 10E3/UL (ref 0–1.3)
MONOCYTES NFR BLD AUTO: 6 %
NEUTROPHILS # BLD AUTO: 6.6 10E3/UL (ref 1.6–8.3)
NEUTROPHILS NFR BLD AUTO: 75 %
NRBC # BLD AUTO: 0 10E3/UL
NRBC BLD AUTO-RTO: 0 /100
PLATELET # BLD AUTO: 193 10E3/UL (ref 150–450)
POTASSIUM SERPL-SCNC: 4 MMOL/L (ref 3.4–5.3)
PROT SERPL-MCNC: 7.2 G/DL (ref 6.4–8.3)
RBC # BLD AUTO: 4.13 10E6/UL (ref 3.8–5.2)
RSV RNA SPEC NAA+PROBE: NEGATIVE
SARS-COV-2 RNA RESP QL NAA+PROBE: NEGATIVE
SODIUM SERPL-SCNC: 137 MMOL/L (ref 136–145)
WBC # BLD AUTO: 8.6 10E3/UL (ref 4–11)

## 2023-07-19 PROCEDURE — 96361 HYDRATE IV INFUSION ADD-ON: CPT

## 2023-07-19 PROCEDURE — 250N000011 HC RX IP 250 OP 636: Performed by: EMERGENCY MEDICINE

## 2023-07-19 PROCEDURE — 80053 COMPREHEN METABOLIC PANEL: CPT | Performed by: EMERGENCY MEDICINE

## 2023-07-19 PROCEDURE — 258N000003 HC RX IP 258 OP 636: Performed by: EMERGENCY MEDICINE

## 2023-07-19 PROCEDURE — 96375 TX/PRO/DX INJ NEW DRUG ADDON: CPT

## 2023-07-19 PROCEDURE — 85025 COMPLETE CBC W/AUTO DIFF WBC: CPT | Performed by: EMERGENCY MEDICINE

## 2023-07-19 PROCEDURE — 83690 ASSAY OF LIPASE: CPT | Performed by: EMERGENCY MEDICINE

## 2023-07-19 PROCEDURE — 96374 THER/PROPH/DIAG INJ IV PUSH: CPT

## 2023-07-19 PROCEDURE — 250N000011 HC RX IP 250 OP 636: Mod: JZ | Performed by: EMERGENCY MEDICINE

## 2023-07-19 PROCEDURE — 36415 COLL VENOUS BLD VENIPUNCTURE: CPT | Performed by: EMERGENCY MEDICINE

## 2023-07-19 PROCEDURE — 99284 EMERGENCY DEPT VISIT MOD MDM: CPT | Mod: 25

## 2023-07-19 PROCEDURE — 87637 SARSCOV2&INF A&B&RSV AMP PRB: CPT | Performed by: EMERGENCY MEDICINE

## 2023-07-19 RX ORDER — ONDANSETRON 4 MG/1
4 TABLET, ORALLY DISINTEGRATING ORAL ONCE
Status: COMPLETED | OUTPATIENT
Start: 2023-07-19 | End: 2023-07-19

## 2023-07-19 RX ORDER — METOCLOPRAMIDE HYDROCHLORIDE 5 MG/ML
10 INJECTION INTRAMUSCULAR; INTRAVENOUS ONCE
Status: COMPLETED | OUTPATIENT
Start: 2023-07-19 | End: 2023-07-19

## 2023-07-19 RX ORDER — ONDANSETRON 2 MG/ML
4 INJECTION INTRAMUSCULAR; INTRAVENOUS ONCE
Status: DISCONTINUED | OUTPATIENT
Start: 2023-07-19 | End: 2023-07-19

## 2023-07-19 RX ORDER — PROCHLORPERAZINE MALEATE 10 MG
10 TABLET ORAL EVERY 6 HOURS PRN
Qty: 10 TABLET | Refills: 0 | Status: SHIPPED | OUTPATIENT
Start: 2023-07-19

## 2023-07-19 RX ORDER — DEXAMETHASONE SODIUM PHOSPHATE 10 MG/ML
10 INJECTION, SOLUTION INTRAMUSCULAR; INTRAVENOUS ONCE
Status: COMPLETED | OUTPATIENT
Start: 2023-07-19 | End: 2023-07-19

## 2023-07-19 RX ORDER — DIPHENHYDRAMINE HYDROCHLORIDE 50 MG/ML
25 INJECTION INTRAMUSCULAR; INTRAVENOUS ONCE
Status: COMPLETED | OUTPATIENT
Start: 2023-07-19 | End: 2023-07-19

## 2023-07-19 RX ORDER — KETOROLAC TROMETHAMINE 15 MG/ML
15 INJECTION, SOLUTION INTRAMUSCULAR; INTRAVENOUS ONCE
Status: COMPLETED | OUTPATIENT
Start: 2023-07-19 | End: 2023-07-19

## 2023-07-19 RX ADMIN — METOCLOPRAMIDE HYDROCHLORIDE 10 MG: 5 INJECTION INTRAMUSCULAR; INTRAVENOUS at 04:37

## 2023-07-19 RX ADMIN — DIPHENHYDRAMINE HYDROCHLORIDE 25 MG: 50 INJECTION INTRAMUSCULAR; INTRAVENOUS at 04:30

## 2023-07-19 RX ADMIN — ONDANSETRON 4 MG: 4 TABLET, ORALLY DISINTEGRATING ORAL at 03:37

## 2023-07-19 RX ADMIN — SODIUM CHLORIDE 1000 ML: 9 INJECTION, SOLUTION INTRAVENOUS at 04:29

## 2023-07-19 RX ADMIN — KETOROLAC TROMETHAMINE 15 MG: 15 INJECTION, SOLUTION INTRAMUSCULAR; INTRAVENOUS at 04:31

## 2023-07-19 RX ADMIN — DEXAMETHASONE SODIUM PHOSPHATE 10 MG: 10 INJECTION INTRAMUSCULAR; INTRAVENOUS at 04:34

## 2023-07-19 ASSESSMENT — ACTIVITIES OF DAILY LIVING (ADL): ADLS_ACUITY_SCORE: 35

## 2023-07-19 NOTE — ED PROVIDER NOTES
History     Chief Complaint:  Nausea & Vomiting     HPI   Jyoti Pitts is a 38 year old female with a history of cervical cancer status post hysterectomy who presents with headache, nausea and vomiting since 8 PM.  She feels cold but denies fever, cough, body aches.  Has not had any diarrhea.  There is no associated abdominal pain.  Does not have visual symptoms in association with a headache.  States that she gets headaches frequently.  Does have nausea with them and might vomit but then feels better.  Tried taking Tylenol at home without change in symptoms.   utilized.      Independent Historian:    Patient; here accompanied      Review of External Notes:  CT abd/pelvis in March for nausea and vomiting    Medications:    acetaminophen (TYLENOL) 325 MG tablet  ibuprofen (ADVIL/MOTRIN) 600 MG tablet  oxyCODONE (ROXICODONE) 5 MG tablet  senna-docusate (SENOKOT-S/PERICOLACE) 8.6-50 MG tablet        Past Medical History:    Past Medical History:   Diagnosis Date     Cervical cancer (H)        Past Surgical History:    Past Surgical History:   Procedure Laterality Date     DILATION AND CURETTAGE, CONIZATION, COMBINED N/A 5/12/2022    Procedure: Cervical Cone Biopsy, Endocervical Curettage;  Surgeon: Miranda Rosenthal MD;  Location: [facility] OR          Physical Exam     Patient Vitals for the past 24 hrs:   BP Temp Temp src Pulse Resp SpO2   07/19/23 0332 111/79 97.1  F (36.2  C) Temporal 80 16 98 %        Physical Exam  Eyes:  Sclera white; Pupils are equal and round  ENT:    External ears and nares normal  CV:  Rate as above with regular rhythm   Resp:  Breath sounds clear and equal bilaterally    Non-labored, no retractions or accessory muscle use  GI:  Abdomen is soft, non-tender, non-distended    No rebound tenderness or peritoneal features  MS:  Moves all extremities  Skin:  Warm and dry  Neuro:  Speech is normal and fluent. No apparent deficit.      Emergency Department Course      Imaging:  No orders to display     Laboratory:  Labs Ordered and Resulted from Time of ED Arrival to Time of ED Departure   COMPREHENSIVE METABOLIC PANEL - Abnormal       Result Value    Sodium 137      Potassium 4.0      Chloride 106      Carbon Dioxide (CO2) 23      Anion Gap 8      Urea Nitrogen 13.9      Creatinine 0.57      Calcium 9.2      Glucose 120 (*)     Alkaline Phosphatase 64      AST 24      ALT 20      Protein Total 7.2      Albumin 4.2      Bilirubin Total 0.2      GFR Estimate >90     INFLUENZA A/B, RSV, & SARS-COV2 PCR - Normal    Influenza A PCR Negative      Influenza B PCR Negative      RSV PCR Negative      SARS CoV2 PCR Negative     LIPASE - Normal    Lipase 41     CBC WITH PLATELETS AND DIFFERENTIAL    WBC Count 8.6      RBC Count 4.13      Hemoglobin 13.2      Hematocrit 38.8      MCV 94      MCH 32.0      MCHC 34.0      RDW 12.6      Platelet Count 193      % Neutrophils 75      % Lymphocytes 15      % Monocytes 6      % Eosinophils 2      % Basophils 1      % Immature Granulocytes 1      NRBCs per 100 WBC 0      Absolute Neutrophils 6.6      Absolute Lymphocytes 1.3      Absolute Monocytes 0.5      Absolute Eosinophils 0.1      Absolute Basophils 0.1      Absolute Immature Granulocytes 0.0      Absolute NRBCs 0.0          Procedures   NA    Emergency Department Course & Assessments:             Interventions:  Medications   ondansetron (ZOFRAN ODT) ODT tab 4 mg (4 mg Oral $Given 7/19/23 0337)   0.9% sodium chloride BOLUS (0 mLs Intravenous Stopped 7/19/23 0605)   ketorolac (TORADOL) injection 15 mg (15 mg Intravenous $Given 7/19/23 0431)   metoclopramide (REGLAN) injection 10 mg (10 mg Intravenous $Given 7/19/23 0437)   diphenhydrAMINE (BENADRYL) injection 25 mg (25 mg Intravenous $Given 7/19/23 0430)   dexamethasone PF (DECADRON) injection 10 mg (10 mg Intravenous $Given 7/19/23 0434)        Medical Decision Making:  Jyoti Pitts is a 38 year old female who presents with a  headache and pattern suggestive of migraine, a diagnosis that she does not currently have.  Meningitis, pseudotumor, subarachnoid hemorrhage, CNS tumor, venous thrombosis and stroke are considered as part of the differential, and considered unlikely based on H&P. There has been no trauma to increase risk of intracranial bleed.  No tenderness on abdominal exam and no lab findings of hepatitis, pancreatitis, or biliary obstruction.  I do not believe imaging is indicated at this time.  Mild elevation of a random glucose does not require further work up at this time.  No severe electrolyte abnormalities or BILLY that can be seen with dehydration.  Her pain has improved with medication interventions.  Referred to neurology due to recurrent headaches.  Referred for primary care as she states she does not currently have a PCP.    Diagnosis:    ICD-10-CM    1. Recurrent headache  R51.9 Primary Care Referral     Adult Neurology  Referral      2. Nausea and vomiting, unspecified vomiting type  R11.2 Primary Care Referral     Adult Neurology  Referral           Discharge Medications:  Discharge Medication List as of 7/19/2023  6:06 AM      START taking these medications    Details   prochlorperazine (COMPAZINE) 10 MG tablet Take 1 tablet (10 mg) by mouth every 6 hours as needed (headache, nausea, or vomiting), Disp-10 tablet, R-0, E-Prescribe               Michelle Lima MD  07/21/23 5907

## 2023-07-19 NOTE — ED TRIAGE NOTES
used for assessment. Pt presents with nausea and vomiting x 12 hours. She endorses headache, and chills. Denies cough, body aches, diarrhea. No chance of pregnancy as pt has hx of hysterectomy.

## 2024-09-01 ENCOUNTER — HEALTH MAINTENANCE LETTER (OUTPATIENT)
Age: 39
End: 2024-09-01

## 2025-04-19 ENCOUNTER — HEALTH MAINTENANCE LETTER (OUTPATIENT)
Age: 40
End: 2025-04-19

## (undated) DEVICE — SOL NACL 0.9% IRRIG 1000ML BOTTLE 2F7124

## (undated) DEVICE — SU VICRYL 0 CT-2 27" J334H

## (undated) DEVICE — BLADE KNIFE SURG 11 371111

## (undated) DEVICE — LINEN TOWEL PACK X5 5464

## (undated) DEVICE — SURGICEL HEMOSTAT 4X8" 1952

## (undated) DEVICE — DRSG TELFA 3X8" 1238

## (undated) DEVICE — TUBING SMOKE EVAC 2.2CMX3M SEA3715

## (undated) DEVICE — PREP SCRUB SOL EXIDINE 4% CHG 4OZ 29002-404

## (undated) DEVICE — GLOVE PROTEXIS BLUE W/NEU-THERA 7.0  2D73EB70

## (undated) DEVICE — ESU ELEC LEEP BALL 5MM

## (undated) DEVICE — SWAB PROCTO 16" 2/PK 32-046

## (undated) DEVICE — SUCTION MANIFOLD NEPTUNE 2 SYS 4 PORT 0702-020-000

## (undated) DEVICE — PACK VAG HYST

## (undated) DEVICE — SOL WATER IRRIG 1000ML BOTTLE 2F7114

## (undated) DEVICE — GLOVE PROTEXIS POWDER FREE SMT 6.5  2D72PT65X

## (undated) RX ORDER — HEPARIN SODIUM 5000 [USP'U]/.5ML
INJECTION, SOLUTION INTRAVENOUS; SUBCUTANEOUS
Status: DISPENSED
Start: 2022-05-12

## (undated) RX ORDER — FENTANYL CITRATE 50 UG/ML
INJECTION, SOLUTION INTRAMUSCULAR; INTRAVENOUS
Status: DISPENSED
Start: 2022-05-12

## (undated) RX ORDER — OXYCODONE HYDROCHLORIDE 5 MG/1
TABLET ORAL
Status: DISPENSED
Start: 2022-05-12

## (undated) RX ORDER — ACETAMINOPHEN 325 MG/1
TABLET ORAL
Status: DISPENSED
Start: 2022-05-12